# Patient Record
Sex: MALE | Race: WHITE | NOT HISPANIC OR LATINO | Employment: OTHER | URBAN - METROPOLITAN AREA
[De-identification: names, ages, dates, MRNs, and addresses within clinical notes are randomized per-mention and may not be internally consistent; named-entity substitution may affect disease eponyms.]

---

## 2017-02-16 ENCOUNTER — ALLSCRIPTS OFFICE VISIT (OUTPATIENT)
Dept: OTHER | Facility: OTHER | Age: 77
End: 2017-02-16

## 2017-03-13 ENCOUNTER — GENERIC CONVERSION - ENCOUNTER (OUTPATIENT)
Dept: OTHER | Facility: OTHER | Age: 77
End: 2017-03-13

## 2018-01-10 NOTE — H&P
Demographics     Postal Code: [id number]     Admission Date: 4/4/2016     Procedure:  Aorto_bi_iliac graft AAA     Target Side: N/A     Discharge Status & Location: Alive     Discharge Date:         ICU Stay (day): 0  Preoperative Data     Family History of AAA: No     Risk Factors & Systems Review:         Weight Height Units: Metric (cm - kg)           Height (cm): 193           Height (in): 76        Hypertension: Yes        Hyperlipidemia: Yes     Previous procedures:         Bypass: No        CEA: No        Aneurysm Repair: No        PTA/Stent: No        Major Amputation: No        Prev LL Vein Procedure: No  Procedure     Indication: Endoleak post EVAR     Incision Time: 12:00:01 AM     Urgency: Elective     Redo Status: Primary     Anesthesia:         Anesthesia: General     Estimated blood loss (ml): 0     Procedure Duration (min): 0     Medications, fluids & blood transfusion:         Blood Transfusion: No           Packed RBCs (unit): 0           FF Plasma (unit): 0           Cryoprecipitate (unit): 0           Platelets (unit): 0        Autotransfusion (ml): 0  Postoperative Data     Time to extubation (hour): 0     Time to oral intake (hour): 0     Time to ambulation (hour): 0  Complications?: No     Myocardial Infarction: No     Dysrhythmia: No     CHF: No     Change of Renal Function: None     Respiratory: None     Return to OR: No        Bleeding: No        Infection: No        Thrombosis: No        Revision: No     Bowel ischemia: None     Leg ischemia/embolism: None     Wound Infection: No

## 2018-01-12 NOTE — RESULT NOTES
Message   Patient will require open revision of endovascular aneurysm repair type I endoleak  Currently scheduled to see me March 8, 2016 at [facility]  If timeslot available and if the patient is willing to travel to the [facility] he can see Dr Judie Huerta next week to initiate the preoperative planning  Verified Results  CTA ABDOMEN PELVIS W WO CONTRAST [id number] 08:45AM Bita Forman     Test Name Result Flag Reference   CTA ABDOMEN PELVIS W WO CONTRAST (Report)     * I personally telephoned this result to [facility] on 2/16/2016 2:30 PM    CT ANGIOGRAM OF THE ABDOMEN AND PELVIS WITH AND WITHOUT IV CONTRAST     INDICATION: Abdominal aortic aneurysm status post endovascular aneurysm repair  History of endoleak  COMPARISON: January 20, 2015     TECHNIQUE: CT angiogram examination of the abdomen and pelvis was performed according to standard protocol  Contrast as well as noncontrast images were obtained  100 ml of Omnipaque 350 was injected intravenously  3D reconstructions were performed an independent workstation, and are supplied for review  FINDINGS:     VASCULAR STRUCTURES: Status post endovascular aneurysm repair with a Austell excluder endograft and left limb extension for type I endoleak  There continues to be a large type I endoleak from the proximal end of the graft  The aneurysm sac now measures    80 3 x 71 3 mm (previously 70 2 x 65 1 mm  In addition, the neck of the aneurysm is visibly enlarged measuring 42 9 mm just inferior to the left renal artery (previously 35 3 mm)  The celiac artery, superior mesenteric artery, and both renal arteries    are patent  The right and left iliofemoral segments and right internal iliac artery are patent  The left internal iliac artery remains occluded status post endovascular repair  OTHER FINDINGS     ABDOMEN     LOWER CHEST: No significant abnormality in the lung bases       LIVER/BILIARY TREE: Stable appearance of the multiple hepatic hemangiomas  GALLBLADDER: No calcified gallstones  No pericholecystic inflammatory change  SPLEEN: Unremarkable  Normal size  PANCREAS: Unremarkable  ADRENAL GLANDS: Unremarkable  KIDNEYS/URETERS: No solid renal mass  No hydronephrosis  No urinary tract calculi  PELVIS     REPRODUCTIVE ORGANS: Unremarkable for patient's age  URINARY BLADDER: Unremarkable  ADDITIONAL ABDOMINAL AND PELVIC STRUCTURES     STOMACH AND BOWEL: Unremarkable  ABDOMINOPELVIC CAVITY: No pathologically enlarged mesenteric or retroperitoneal lymph nodes  No ascites or free intraperitoneal air  ABDOMINAL WALL/INGUINAL REGIONS: Right inguinal hernia containing a loop of colon  Umbilical hernia with a loop of bowel  OSSEOUS STRUCTURES: No acute fracture or destructive osseous lesion  IMPRESSION:     Persistent type I endoleak from the proximal graft with enlarging aneurysm sac size and aneurysm neck  Right inguinal and umbilical hernia         Workstation performed: [id number]     Signed by:   Sparkle Briseno MD   2/16/16   100

## 2018-01-13 VITALS
BODY MASS INDEX: 31.81 KG/M2 | WEIGHT: 240 LBS | HEIGHT: 73 IN | SYSTOLIC BLOOD PRESSURE: 130 MMHG | DIASTOLIC BLOOD PRESSURE: 80 MMHG

## 2018-01-13 NOTE — RESULT NOTES
Message   Needs OV to discuss CTA and options     Verified Results  CTA ABDOMEN PELVIS W WO CONTRAST [id number] 08:45AM Nadia Coukaya     Test Name Result Flag Reference   CTA ABDOMEN PELVIS W [address] (Report)     * I personally telephoned this result to [facility] on 2/16/2016 2:30 PM      Workstation performed: [id number]       IMPRESSION:     Persistent type I endoleak from the proximal graft with enlarging aneurysm sac size and aneurysm neck  Right inguinal and umbilical hernia         Workstation performed: [id number]     Signed by:   Aaliyah Diaz MD   2/16/16   100

## 2018-01-14 NOTE — MISCELLANEOUS
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)   3  Atrial fibrillation (427 31) (I48 91)   4  Carotid artery stenosis (433 10) (I65 29)   5  Chronic venous hypertension (459 30) (I87 309)   6  Coronary atherosclerosis of native coronary artery (414 01) (I25 10)   7  Endoleak post endovascular aneurysm repair, subsequent encounter (V58 89,996 1)   (T82 330D)   8  Hyperlipidemia (272 4) (E78 5)   9  Hypertension (401 9) (I10)    Current Meds   1  Carbidopa-Levodopa  MG Oral Tablet; Therapy: (Recorded:61Iby8323) to Recorded   2  Diltiazem HCl - 30 MG Oral Tablet; Therapy: (Kimberly Bar) to Recorded   3  Jantoven 5 MG Oral Tablet; Therapy: ([id number]) to Recorded   4  Metoprolol Tartrate 50 MG Oral Tablet; TAKE 1 TABLET TWICE DAILY; Therapy: (Kimberly Bar) to Recorded   5  Simvastatin 20 MG Oral Tablet; Therapy: (Kimberly Bar) to Recorded   6  Taztia  MG Oral Capsule Extended Release 24 Hour; Therapy: [id number] to Recorded    Allergies    1   Penicillins    Signatures   Electronically signed by : Lobito Hamilton, ; Apr 11 2016 11:54AM EST                       (Author)

## 2018-01-14 NOTE — MISCELLANEOUS
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)  3  Atrial fibrillation (427 31) (I48 91)  4  Carotid artery stenosis (433 10) (I65 29)  5  Chronic venous hypertension (459 30) (I87 309)  6  Coronary atherosclerosis of native coronary artery (414 01) (I25 10)  7  Endoleak post endovascular aneurysm repair, subsequent encounter (V58 89,996 1)   (T82 330D)  8  Hyperlipidemia (272 4) (E78 5)  9  Hypertension (401 9) (I10)    Current Meds  1  Carbidopa-Levodopa  MG Oral Tablet; Therapy: (Recorded:68Phg4560) to Recorded  2  Diltiazem HCl - 30 MG Oral Tablet; Therapy: (Lorriane Mahamed) to Recorded  3  Jantoven 5 MG Oral Tablet; Therapy: ([id number] 8999) to Recorded  4  Metoprolol Tartrate 50 MG Oral Tablet; TAKE 1 TABLET TWICE DAILY; Therapy: (Lorriane Billow) to Recorded  5  Simvastatin 20 MG Oral Tablet; Therapy: (Lorriane Billow) to Recorded  6  Taztia  MG Oral Capsule Extended Release 24 Hour; Therapy: [id number] to Recorded    Allergies   1  Penicillins    Signatures   Electronically signed by :  Des Lewis, ; Feb 26 2016  9:37AM EST                       (Author)

## 2018-01-15 NOTE — PROGRESS NOTES
Preliminary Nursing Report                Patient Information    Initial Encounter Entry Date:   3/21/2016 3:30 PM EST (Automated Transmission Automated Transmission)       Last Modified:   {Barry Alfaro}              Legal Name: [employer] [place] Number:        YOB: 1940        Age (years): 66        Gender: M        Body Mass Index (BMI): 28 kg/m2        Height: 76 in  Weight: 228 lbs (103 kgs)           Address:   [address] [place] [id number]               Phone: [phone]   (consent to leave messages)        Email:        Ethnicity: Decline to State        Islam:        Marital Status:        Preferred Language: English        Race: Other Race                    Patient Insurance Information        Primary Insurance Information Carrier Name: {Primary  CarrierName}           Carrier Address:   {Primary  CarrierAddress}              Carrier Phone: {Primary  CarrierPhone}          Group Number: {Primary  GroupNumber}          Policy Number: {Primary  PolicyNumber}          Insured Name: {Primary  InsuredName}          Insured : {Primary  InsuredDOB}          Relationship to Insured: {Primary  RelationshiptoInsured}           Secondary Insurance Information Carrier Name: {Secondary  CarrierName}           Carrier Address:   {Secondary  CarrierAddress}              Carrier Phone: {Secondary  CarrierPhone}          Group Number: {Secondary  GroupNumber}          Policy Number: {Secondary  PolicyNumber}          Insured Name: {Secondary  InsuredName}          Insured : {Secondary  InsuredDOB}          Relationship to Insured: {Secondary  RelationshiptoInsured}                       Health Profile   Booking #:   Iker Denny #: [id number]               DOS: 2016    Surgery : Open repair of infrarenal aortic aneurysm or dissection, plus repair of associated arterial trauma, following unsuccessful endovascular repair; [id number] prosthesis Add'l Procedures/Notes:     Surgery Risk: Major          Precautions     Atrial fibrillation       Coronary atherosclerosis of native coronary artery                   Allergies    Penicillins             Medications    Carbidopa-Levodopa  MG Oral Tablet       Diltiazem HCl - 30 MG Oral Tablet       Jantoven 5 MG Oral Tablet       Metoprolol Tartrate 50 MG Oral Tablet       Simvastatin 20 MG Oral Tablet       Taztia  MG Oral Capsule Extended Release 24 Hour               Conditions    Aneurysm of abdominal aorta       Atheroscler native arteries the extremities w/intermit claudication       Atrial fibrillation       Carotid artery stenosis       Chronic venous hypertension       Coronary atherosclerosis of native coronary artery       Endoleak post endovascular aneurysm repair, subsequent encounter       Hyperlipidemia       Hypertension               Family History    None             Surgical History    None             Social History    Former smoker                               Patient Instructions       ? NPO Instructions   The day before surgery it is recommended to have a light dinner at your usual time and you are allowed a light snack early in the evening  Do not eat anything heavy or eat a big meal after 7pm  Do not eat or drink anything after midnight prior to your surgery  If you are supposed to take any of your medications, do so with a sip of water  Failure to follow these instructions can lead to an increased risk of lung complications and may result in a delay or cancellation of your procedure  If you have any questions, contact your institution for further instructions  No candy, no gum, no mints, no chewing tobacco   Triggered by: Medical Procedure Risk         ? Beta Blocker 3, 2, c, 4, 6, 5  Please continue to take this medication on your normal schedule  If this is an oral medication and you take in the morning, you may do so with a sip of water    Triggered by: Metoprolol Tartrate 50 MG Oral Tablet         ? Calcium Blocker (Blood Pressure Medication) 3, 4, 6, 5, 2  Please continue to take this medication on your normal schedule  If this is an oral medication and you take in the morning, you may do so with a sip of water  Triggered by: Diltiazem HCl - 30 MG Oral Tablet, , Taztia  MG Oral Capsule Extended Release 24 Hour         ? Cholesterol Medication 81, 82  Please continue to take this medication on your normal schedule  If this is an oral medication and you take in the morning, you may do so with a sip of water  Triggered by: Simvastatin 20 MG Oral Tablet         ? Diabetic Medication   Please decrease your morning insulin dose to one-half of normal dose  If you are taking oral diabetes medications, the morning dose should be omitted  If taking metformin (Glucophage), discontinue the medication for 24 hours prior to surgery  If you have an insulin pump, continue at a basal rate only  Triggered by: Shon Oris 5 MG Oral Tablet         ? Levodopa/Carbidopa (Parkinson Medications) 49, 50, 51  Please continue to take this medication on your normal schedule  If this is an oral medication and you take in the morning, you may do so with a sip of water  Triggered by: Carbidopa-Levodopa  MG Oral Tablet               Testing Considerations       ? Coagulation Tests (PT/PTT/INR) t  Triggered by: Aneurysm of abdominal aorta, Carotid artery stenosis         ? Complete Blood Count (CBC) t, client, client  If test was completed and normal within last six months, repeat test is not necessary  Triggered by: Aneurysm of abdominal aorta, Atrial fibrillation, Carotid artery stenosis, Coronary atherosclerosis of native coronary artery, Age or Facility Rec         ? Comprehensive Metabolic Panel (CMP) t  If test was completed and normal within last six months, repeat test is not necessary  Triggered by:  Aneurysm of abdominal aorta, Atrial fibrillation, Coronary atherosclerosis of native coronary artery         ? Consider Chest X-ray (CXR) t  Consider a Chest X-Ray (CXR) if patient is having respiratory symptoms  Triggered by: Aneurysm of abdominal aorta, Carotid artery stenosis, Age or Facility Rec         ? Electrocardiogram (ECG) t  Patient does not need new test if normal ECG is present within the last six months and no change in clinical condition  Triggered by: Aneurysm of abdominal aorta, Atrial fibrillation, Carotid artery stenosis, Hypertension, Chronic venous hypertension, Coronary atherosclerosis of native coronary artery, Age or Facility Rec         ? Type and Screen client  Type and Screen - Blood: If there is anticipated or possible large blood loss with this procedure, then a Type and Screen for Blood should be ordered  Triggered by: Age or Facility Rec               Consultations       ? Cardiac Consult (Major MI) c  If the patient has had a Myocardial Infarction within 30 days then a cardiac consult is indicated  Also, if the patient is having increasing frequency or intensity of chest pain then a cardiac consult is indicated  Otherwise, optimization of medical therapy is recommended under the direction of the PCP or cardiologist   Triggered by: Coronary atherosclerosis of native coronary artery         ? Cardiac Consult (Major Rate) c  If the patient has a heart rate of greater than 100 bpm, or if the heart rhythm disturbance is new, then a cardiac consult is indicated  Otherwise, optimization of medical therapy is recommended under the direction of the PCP or cardiologist   Triggered by: Atrial fibrillation         ? Cardiac Evaluation   Further evaluation of cardiopulmonary status should be strongly considered  Triggered by: Aneurysm of abdominal aorta, Carotid artery stenosis         ? Primary Care Physician Evaluation   Primary care physician may need to evaluate patient prior to surgery  This is likely NOT necessary if the listed conditions are chronic and stable    Triggered by: Medical Procedure Risk               Miscellaneous Questions         Question: Are you able to walk up a flight of stairs, walk up a hill or do heavy housework WITHOUT having chest pain or shortness of breath? Answer: YES                   Allergies/Conditions/Medications Not Found        The following were not recognized by our system when generating the recommendations  Please consider if this would impact any preoperative protocols  ? Endoleak post endovascular aneurysm repair, subsequent encounter                  Appointment Information         Date:    04/04/2016        Location:    [place]        Address:           Directions:                      Footnotes revision 14      ?? Denotes a free-text entry  Legal Disclaimer: Any and all recommendations and services provided herein are designed to assist in the preoperative care of the patient  Nothing contained herein is designed to replace, eliminate or alleviate the responsibility of the attending physician to supervise and determine the patient?s preoperative care and course of treatment  Failure to provide complete, accurate information may negatively impact the system?s ability to recommend the proper preoperative protocol  THE ATTENDING PHYSICIAN IS RESPONSIBLE TO REVIEW THE SUGGESTED PREOPERATIVE PROTOCOLS/COURSE OF TREATMENT AND PRESCRIBE THE FINAL COURSE OF PREOPERATIVE TREATMENT IN CONSULTATION WITH THE PATIENT  THE ePREOP SYSTEM AND ITS MATERIALS ARE PROVIDED ? AS IS? WITHOUT WARRANTY OF ANY KIND, EXPRESS OR IMPLIED, INCLUDING, BUT NOT LIMITED TO, WARRANTIES OF PERFORMANCE OR MERCHANTABILITY OR FITNESS FOR A PARTICULAR PURPOSE  PATIENT AND PHYSICIANS HEREBY AGREE THAT THEIR USE OF THE MATERIALS AND RESOURCES ACT AS A CONSENT TO RELEASE AND WAIVE ePREOP FROM ANY AND ALL CLAIMS OF WARRANTY, TORT OR CONTRACT LAW OF ANY KIND             Electronically signed by:Cyril Mckinnon MD  Mar 23 2016 10:55AM EST

## 2018-01-15 NOTE — MISCELLANEOUS
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)   3  Atrial fibrillation (427 31) (I48 91)   4  Carotid artery stenosis (433 10) (I65 29)   5  Chronic venous hypertension (459 30) (I87 309)   6  Coronary atherosclerosis of native coronary artery (414 01) (I25 10)   7  Endoleak post endovascular aneurysm repair, subsequent encounter (V58 89,996 1)   (T82 330D)   8  Hyperlipidemia (272 4) (E78 5)   9  Hypertension (401 9) (I10)    Current Meds   1  Carbidopa-Levodopa  MG Oral Tablet; Therapy: (Recorded:[id number]) to Recorded   2  Diltiazem HCl - 30 MG Oral Tablet; Therapy: (Jenny Jaeger) to Recorded   3  Jantoven 5 MG Oral Tablet; Therapy: ([id number] [id number]) to Recorded   4  Metoprolol Tartrate 50 MG Oral Tablet; TAKE 1 TABLET TWICE DAILY; Therapy: (Jenny Jaeger) to Recorded   5  Simvastatin 20 MG Oral Tablet; Therapy: (Jenny Jaeger) to Recorded   6  Taztia  MG Oral Capsule Extended Release 24 Hour; Therapy: [id number] to Recorded    Allergies    1  Penicillins    Signatures   Electronically signed by :  Martha Perkins, ; Apr 13 2016  4:08PM EST                       (Author)

## 2018-01-16 NOTE — MISCELLANEOUS
Message  both daughters Lanelle Canavan and Mónica Hernandez notified of below    From: Karan Washington   Sent: Tuesday, April 12, 2016 10:38 AM  To: Dayna Lynn  Subject: RE: Gin Aaron 1940    He should come into the office to be seen  No great treatment for hiccupps other than treating the reason causing them and not sure what that is in this case  Kecia Haro MD FACS   Vascular   [address] [place] [address]        ________________________________________  From: Dayna Lynn  Sent: Tuesday, April 12, 2016 10:03 AM  To: Karan Washington  Subject: Gin Aaron 1940  He was your OAR 4/4/16  He c/o constant hiccups x 2 days  Not sure what to tell him regarding this  Anything he can do to help with this? Thanks! 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)   3  Atrial fibrillation (427 31) (I48 91)   4  Carotid artery stenosis (433 10) (I65 29)   5  Chronic venous hypertension (459 30) (I87 309)   6  Coronary atherosclerosis of native coronary artery (414 01) (I25 10)   7  Endoleak post endovascular aneurysm repair, subsequent encounter (V58 89,996 1)   (T82 330D)   8  Hyperlipidemia (272 4) (E78 5)   9  Hypertension (401 9) (I10)    Current Meds   1  Carbidopa-Levodopa  MG Oral Tablet; Therapy: (Recorded:85Kbo7777) to Recorded   2  Diltiazem HCl - 30 MG Oral Tablet; Therapy: (Noel Lizeth) to Recorded   3  Jantoven 5 MG Oral Tablet; Therapy: (Beba Grider) to Recorded   4  Metoprolol Tartrate 50 MG Oral Tablet; TAKE 1 TABLET TWICE DAILY; Therapy: (Noel Lizeth) to Recorded   5  Simvastatin 20 MG Oral Tablet; Therapy: (Noel Lizeth) to Recorded   6  Taztia  MG Oral Capsule Extended Release 24 Hour; Therapy: [id number] to Recorded    Allergies    1   Penicillins    Signatures   Electronically signed by : Breanna Glover, ; Apr 12 2016  1:25PM EST                       (Author)

## 2018-01-16 NOTE — MISCELLANEOUS
Message  shagufta from a notified    From: Dominguez Simmons: Tuesday, April 19, 2016 3:14 PM  To: Osvaldo Trinidadan: Caden Shannon  Subject: RE: Ailyn Disla 1940    Would try increasing fiber intake and Colace stool softener (instead of MOM)  For swelling would advise elevation  Once seen in office tomorrow tubi  may be suggested but don't necessarily want ACE wraps until I know more about peripheral circulation  Maxx Hodgson, [facility]  [facility]  Certified Registered Nurse Practitioner  [address] [facility], [address] [place] [address], [address] [place] [address]  [phone]    Confidentiality Notice: This e-mail message, including any attachments, is for the sole use of intended recipient(s) and may contain confidential and privileged information  Any unauthorized review, use, disclosure or distribution is prohibited  If you are not the intended recipient, please contact the sender by reply e-mail and destroy all copies of the original message  From: Nahomi Bowman   Sent: Tuesday, April 19, 2016 2:17 PM  To: Maryuri Villa  Cc: Caden Shannon  Subject: Ailyn Disla 1940    He was Dr Ulysses Padilla 4/4/2016   [facility] called to report slow moving bowels with abd distention- Can he have Milk of Mag? Also, he had some post op leg edema, [facility] put him on Lasix and now his legs are starting to weep  Can they use ace wraps? 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)   3  Atrial fibrillation (427 31) (I48 91)   4  Carotid artery stenosis (433 10) (I65 29)   5  Chronic venous hypertension (459 30) (I87 309)   6  Coronary atherosclerosis of native coronary artery (414 01) (I25 10)   7  Endoleak post endovascular aneurysm repair, subsequent encounter (V58 89,996 1)   (T82 330D)   8  Hiccoughs (786 8) (R06 6)   9  Hyperlipidemia (272 4) (E78 5)   10  Hypertension (401 9) (I10)   11  Postoperative state (V45 89) (Z98 89)   12  Swelling (782 3) (R60 9)    Current Meds   1  Carbidopa-Levodopa  MG Oral Tablet; Therapy: (Recorded:[id number]) to Recorded   2  Diltiazem HCl - 30 MG Oral Tablet; Therapy: (Camilo Sanches) to Recorded   3  Furosemide 40 MG Oral Tablet; TAKE 1 TABLET DAILY AS DIRECTED; Therapy: 14Apr2016 to (Evaluate:21Apr2016); Last Rx:14Apr2016 Ordered   4  Jantoven 5 MG Oral Tablet; Therapy: [phone]) to Recorded   5  Metoprolol Tartrate 50 MG Oral Tablet; TAKE 1 TABLET TWICE DAILY; Therapy: (Camilo Sanches) to Recorded   6  Oxycodone-Acetaminophen 5-325 MG Oral Tablet; TAKE 1 TABLET EVERY 8 HOURS   AS NEEDED; Therapy: 14Apr2016 to (Evaluate:26Mrq5476); Last Rx:14Apr2016 Ordered   7  Simvastatin 20 MG Oral Tablet; Therapy: (Camilo Sanches) to Recorded   8  Taztia  MG Oral Capsule Extended Release 24 Hour; Therapy: [id number] to Recorded    Allergies    1   Penicillins    Signatures   Electronically signed by : Odilia Garrison, ; Apr 19 2016  3:20PM EST                       (Author)

## 2018-01-18 NOTE — MISCELLANEOUS
Dear Sinan Hamilton : We missed you for your originally scheduled neurological followup appointment with Dr Henry Keating  Please call at your earliest convenience to reschedule this appointment  Sincerely,     Abdon Carrizales 102      Electronically signed by: Stephanie Bustamante   Apr 18 2016  8:30AM EST Co-author

## 2018-01-18 NOTE — PROCEDURES
Procedures by Tesha Contreras MD  at 4/5/2016 11:09 AM      Author:  Tesha Contreras MD Service:  Critical Care/ICU Author Type:  Resident    Filed:  4/5/2016 11:20 AM Date of Service:  4/5/2016 11:09 AM Status:  Attested    :  Tesha Contreras MD (Resident)  Cosigner:  Clara Barrios DO at 4/7/2016  3:24 PM      Pre-procedure Diagnoses:       1  Postoperative hypovolemic shock, initial encounter [T81 19XA]                Post-procedure Diagnoses:       1  Postoperative hypovolemic shock, initial encounter [T81 19XA]                Procedures:       1  CENTRAL LINE [ADF38 (Custom)]              Attestation signed by Clara Barrios DO at 4/7/2016  3:24 PM           I was present and supervised all critical elements of this procedure  I ensured full compliance with sterile procedure was followed  Procedure Note PGY 2  Procedure: R IJ Central venous catheter insertion    Discussed with patient and daughter Kelsea Dixon via the telephone the need for central line placement due to increasing pressor requirements and acquisition of more diagnostic data to ascertain etiology of patient's hypotension  Dr Kelsea Dixon and patient gave verbal  consent for procedure  The patient was placed supine on the bed and given 1 mg of Ativan  After the patient was correctly identified and the planned procedure agreed upon by all team members present the patient was prepped and draped in the standard surgical fashion  The patient was then placed in Trendelenburg position  And was turned the  left  Using ultrasound the right internal jugular vein was identified as well as the right carotid artery AND lidocaine approximately 3 cc was used as local anesthesia  The vein was easily compressible and quite large and a -pulsatile noncompressible  was noted to be medial and posterior to the vein  The vein wasn't accessed  Cutaneously under direct visualization of ultrasound   Venous blood was withdrawn  The syringe was removed and no pulsatile blood was noted  A guidewire was advanced into the introducer  needle  A small incision was made at the skin surface with an 11 blade scalpel  The needle was withdrawn and dilator was placed over the guidewire to dilate the subcutaneous tissues  The dilator was removed and a 7 Western Ami triple lumen catheter was placed  over the guidewire wire using the classic Salinger technique  The guidewires and removed  The catheter was inserted into place and all ports were aspirated and flushed with normal saline  The area was cleansed and dried again and a Tegaderm was used as  a sterile dressing over the skin exit site of the triple lumen catheter  The patient tolerated the procedure well with no apparent complications  Post procedure x-ray was obtained and showed the catheter to be in good position in the cavoatrial junction  with no pneumothorax noted on wet read  Dr Priya Waterman  was present for the entire procedure      Billie Hogue MD             Received for:Andrew Rosenberg MD  Apr 7 2016  3:25PM [facility] Standard Time

## 2018-03-07 NOTE — PROCEDURES
Procedure    Surgeon: Dr Camarena [address]   Procedure:   Cognitive Assessment   Cognitive Assessment: Mini- Cog Total Score: 4    denies   Depression Screening   Depression Screening: Total Score:       denies   Cardiac Risk Factors Include:   1) AFIB       Pulmonary Risk Factors Include:      Functional/Performance Assessment   Able to stand up from a chair by themselves and on the first try  Able to get dressed by self  Able to bathe by self  Able to make own meals  Able to get to bathroom by self  There is a bathroom in the home on the same floor they will sepnd most of their time in after surgery  Patient is able to obtain assistance after surgery in their home from a relative or other caregiver that does not reside with them  and has experienced 0 fall(s) in the last year  Frailty Assessment   Frailty Score: 2-3  Weight Loss = 0  Decreased  Strength (weakness)  Exhaustion, 1 = Some or a litle of the time (1-2 days)  Low Physical Activity = 1  Slowed Walking Speed  Interpretation of the Frailty Score, 2 - 3 = Intermediate frail (Pre-frail) inpatient physical therapy consult is appropriate  PRE-FRAIL   Gait & Mobility Assessment Patient required more than 15 seconds to complete TGUT  inpatient physical therapy consult is appropriate  standard   Nutritional Assessment r /rs  had not experienced unexplained weightloss in the past year  Caregiver burden score: (0) No burden at all   Summary: PATIENT LIVES AT [address] 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)   3  Atrial fibrillation (427 31) (I48 91)   4  Carotid artery stenosis (433 10) (I65 29)   5  Chronic venous hypertension (459 30) (I87 309)   6  Coronary atherosclerosis of native coronary artery (414 01) (I25 10)   7  Endoleak post endovascular aneurysm repair, subsequent encounter (V58 89,996 1)   (T82 330D)   8  Hyperlipidemia (272 4) (E78 5)   9  Hypertension (401 9) (I10)    Current Meds    1  Carbidopa-Levodopa  MG Oral Tablet; Therapy: (Recorded:35Iqn0587) to Recorded   2  Diltiazem HCl - 30 MG Oral Tablet; Therapy: (Libby Bjornstad) to Recorded   3  Jantoven 5 MG Oral Tablet; Therapy: ([id number]) to Recorded   4  Metoprolol Tartrate 50 MG Oral Tablet; TAKE 1 TABLET TWICE DAILY; Therapy: (Libby Bjornstad) to Recorded   5  Simvastatin 20 MG Oral Tablet; Therapy: (Libby Bjornstad) to Recorded   6  Taztia  MG Oral Capsule Extended Release 24 Hour; Therapy: [id number] to Recorded    Allergies    1   Penicillins    Signatures   Electronically signed by : Hira Martinez; Mar 29 2016  1:02PM EST                       (Author)    Electronically signed by : Bard Wandy MD; Mar 29 2016  1:18PM EST

## 2018-04-18 ENCOUNTER — TRANSCRIBE ORDERS (OUTPATIENT)
Dept: VASCULAR SURGERY | Facility: CLINIC | Age: 78
End: 2018-04-18

## 2018-04-18 DIAGNOSIS — I71.4 ABDOMINAL AORTIC ANEURYSM WITHOUT RUPTURE (HCC): Primary | ICD-10-CM

## 2018-04-19 ENCOUNTER — HOSPITAL ENCOUNTER (OUTPATIENT)
Dept: NON INVASIVE DIAGNOSTICS | Facility: CLINIC | Age: 78
Discharge: HOME | End: 2018-04-19
Payer: MEDICARE

## 2018-04-19 DIAGNOSIS — I71.4 ABDOMINAL AORTIC ANEURYSM WITHOUT RUPTURE (HCC): ICD-10-CM

## 2018-04-19 PROCEDURE — 93978 VASCULAR STUDY: CPT

## 2018-04-19 PROCEDURE — 93925 LOWER EXTREMITY STUDY: CPT

## 2018-04-19 PROCEDURE — 93923 UPR/LXTR ART STDY 3+ LVLS: CPT

## 2018-04-23 ENCOUNTER — OFFICE VISIT (OUTPATIENT)
Dept: VASCULAR SURGERY | Facility: CLINIC | Age: 78
End: 2018-04-23
Payer: MEDICARE

## 2018-04-23 VITALS
RESPIRATION RATE: 20 BRPM | BODY MASS INDEX: 30.48 KG/M2 | HEIGHT: 73 IN | WEIGHT: 230 LBS | DIASTOLIC BLOOD PRESSURE: 82 MMHG | SYSTOLIC BLOOD PRESSURE: 122 MMHG | HEART RATE: 69 BPM

## 2018-04-23 DIAGNOSIS — Z98.890 HISTORY OF REPAIR OF ANEURYSM OF ABDOMINAL AORTA: ICD-10-CM

## 2018-04-23 DIAGNOSIS — Z95.828 H/O ENDOVASCULAR STENT GRAFT FOR ABDOMINAL AORTIC ANEURYSM: Primary | ICD-10-CM

## 2018-04-23 DIAGNOSIS — I73.9 CLAUDICATION OF CALF MUSCLES (HCC): ICD-10-CM

## 2018-04-23 DIAGNOSIS — I87.2 VENOUS INSUFFICIENCY (CHRONIC) (PERIPHERAL): ICD-10-CM

## 2018-04-23 PROCEDURE — 93922 UPR/L XTREMITY ART 2 LEVELS: CPT | Performed by: SURGERY

## 2018-04-23 PROCEDURE — 93978 VASCULAR STUDY: CPT | Performed by: SURGERY

## 2018-04-23 PROCEDURE — 93925 LOWER EXTREMITY STUDY: CPT | Performed by: SURGERY

## 2018-04-23 PROCEDURE — 99214 OFFICE O/P EST MOD 30 MIN: CPT | Performed by: SURGERY

## 2018-04-23 RX ORDER — ERGOCALCIFEROL (VITAMIN D2) 10 MCG
1 TABLET ORAL DAILY
COMMUNITY

## 2018-04-23 RX ORDER — CARBIDOPA/LEVODOPA 25MG-250MG
1 TABLET ORAL 3 TIMES DAILY
COMMUNITY
Start: 2016-06-10 | End: 2020-01-16 | Stop reason: SDUPTHER

## 2018-04-23 RX ORDER — WARFARIN SODIUM 5 MG/1
5 TABLET ORAL
COMMUNITY

## 2018-04-23 NOTE — PROGRESS NOTES
Assessment/Plan:    Claudication of calf muscles (HCC)  Bilateral calf pain with ambulation  It appears to be nonspecific  I reviewed the lower extremity arterial Doppler there is no focal area of stenosis is diffuse atherosclerotic disease  His toe pressures are normal and ABIs noncompressible  The waveforms appear to be normal  No intervention recommended at this point the he is on warfarin for atrial fibrillation  History of repair of aneurysm of abdominal aorta  Complex past history of various aortic procedures for comedown aortic aneurysm  He had a endograft placed in 2011 followed by a proximal extension processes for endoleak in 2014  He then ultimately had an explantation of the endograft in 2016 with an open repair with aortobiiliac graft for endoleak  Current aortic duplex performed last week shows no evidence of filling in the aortic sac and a patent aortobiiliac bypass  We will monitor again with 1 year follow-up  Venous insufficiency (chronic) (peripheral)  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  There is swelling of the left lower extremity more than the right  There is scaling of the skin as well  I have asked him and the family to apply moisturizer twice daily, elevate leg  Diagnoses and all orders for this visit:    H/O endovascular stent graft for abdominal aortic aneurysm    History of repair of aneurysm of abdominal aorta  -     VAS abdominal aorta/iliacs; complete study; Future  -     VAS lower limb arterial duplex, complete bilateral; Future    Claudication of calf muscles (HCC)  -     VAS abdominal aorta/iliacs; complete study; Future  -     VAS lower limb arterial duplex, complete bilateral; Future    Other orders  -     warfarin (JANTOVEN) 5 mg tablet; Take by mouth  -     carbidopa-levodopa (SINEMET)  mg per tablet;  Take 1 tablet by mouth 3 (three) times a day  -     Multiple Vitamin (DAILY VALUE MULTIVITAMIN) TABS; Take 1 tablet by mouth daily          Subjective:      Patient ID: Carie Augustin is a 76 y o  male  Owen Franco is here to review results of his duplex that was performed last week  He also complains of bilateral pain in his upper calf lower thigh muscles after walking for a few minutes  He then takes to rest and that resolves  This has been ongoing for several months  It is not limiting his day-to-day activity  He is performing physical therapy exercises at home  He suffers from Parkinson's which has resulted in some gait disturbances and he uses cane  He uses Tylenol as needed for the pain  He was taking ibuprofen before but his family doctor has asked him to switch over to Tylenol  The following portions of the patient's history were reviewed and updated as appropriate: allergies, current medications, past family history, past medical history, past social history, past surgical history and problem list     Review of Systems   Constitutional: Positive for fatigue  HENT: Positive for hearing loss  Eyes: Negative  Respiratory: Negative  Cardiovascular: Negative  Gastrointestinal: Negative  Endocrine: Negative  Genitourinary: Negative  Musculoskeletal: Negative  Skin: Positive for color change  Allergic/Immunologic: Negative  Neurological: Negative  Hematological: Negative  Psychiatric/Behavioral: Negative  Objective:      /82 (BP Location: Left arm, Patient Position: Sitting, Cuff Size: Standard)   Pulse 69   Resp 20   Ht 6' 1" (1 854 m)   Wt 104 kg (230 lb)   BMI 30 34 kg/m²          Physical Exam   Constitutional: He is oriented to person, place, and time  He appears well-developed and well-nourished  HENT:   Head: Normocephalic and atraumatic  Cardiovascular: Normal rate, S1 normal and S2 normal   An irregular rhythm present  Murmur heard  Triphasic bilateral anterior tibial signals present   It is difficult to palpate a pulse secondary to thickened skin from chronic venous insufficiency  Bilateral lower extremity lipoma dermatosclerosis changes secondary to chronic venous insufficiency   Pulmonary/Chest: Effort normal and breath sounds normal  No respiratory distress  He has no wheezes  He has no rales  Abdominal: Soft  He exhibits no distension  There is no tenderness  Musculoskeletal: Normal range of motion  He exhibits edema (Left more than right)  Neurological: He is alert and oriented to person, place, and time  Tremor in both hands   Skin: Skin is warm and dry  Psychiatric: He has a normal mood and affect  His behavior is normal    Nursing note and vitals reviewed

## 2018-04-23 NOTE — ASSESSMENT & PLAN NOTE
Bilateral calf pain with ambulation  It appears to be nonspecific  I reviewed the lower extremity arterial Doppler there is no focal area of stenosis is diffuse atherosclerotic disease  His toe pressures are normal and ABIs noncompressible  The waveforms appear to be normal  No intervention recommended at this point the he is on warfarin for atrial fibrillation

## 2018-04-23 NOTE — ASSESSMENT & PLAN NOTE
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

## 2018-04-23 NOTE — LETTER
April 23, 2018     Loraine Brewster MD  [address]  [address] [facility],[address] [facility]    Patient: Domonique Tim   YOB: 1940   Date of Visit: 4/23/2018       Dear Dr Pau Pope:    Thank you for referring Domonique Tim to me for evaluation  Below are my notes for this consultation  If you have questions, please do not hesitate to call me  I look forward to following your patient along with you  Sincerely,        Ron Ramirez MD        CC: MD Ron Mirza MD  4/23/2018  9:54 AM  Sign at close encounter  Assessment/Plan:    Claudication of calf muscles (HCC)  Bilateral calf pain with ambulation  It appears to be nonspecific  I reviewed the lower extremity arterial Doppler there is no focal area of stenosis is diffuse atherosclerotic disease  His toe pressures are normal and ABIs noncompressible  The waveforms appear to be normal  No intervention recommended at this point the he is on warfarin for atrial fibrillation  History of repair of aneurysm of abdominal aorta  Complex past history of various aortic procedures for comedown aortic aneurysm  He had a endograft placed in 2011 followed by a proximal extension processes for endoleak in 2014  He then ultimately had an explantation of the endograft in 2016 with an open repair with aortobiiliac graft for endoleak  Current aortic duplex performed last week shows no evidence of filling in the aortic sac and a patent aortobiiliac bypass  We will monitor again with 1 year follow-up  Venous insufficiency (chronic) (peripheral)  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  There is swelling of the left lower extremity more than the right  There is scaling of the skin as well  I have asked him and the family to apply moisturizer twice daily, elevate leg         Diagnoses and all orders for this visit:    H/O endovascular stent graft for abdominal aortic aneurysm    History of repair of aneurysm of abdominal aorta  -     VAS abdominal aorta/iliacs; complete study; Future  -     VAS lower limb arterial duplex, complete bilateral; Future    Claudication of calf muscles (HCC)  -     VAS abdominal aorta/iliacs; complete study; Future  -     VAS lower limb arterial duplex, complete bilateral; Future    Other orders  -     warfarin (JANTOVEN) 5 mg tablet; Take by mouth  -     carbidopa-levodopa (SINEMET)  mg per tablet; Take 1 tablet by mouth 3 (three) times a day  -     Multiple Vitamin (DAILY VALUE MULTIVITAMIN) TABS; Take 1 tablet by mouth daily          Subjective:      Patient ID: Gary Ramsey is a 76 y o  male  Shellie Booker is here to review results of his duplex that was performed last week  He also complains of bilateral pain in his upper calf lower thigh muscles after walking for a few minutes  He then takes to rest and that resolves  This has been ongoing for several months  It is not limiting his day-to-day activity  He is performing physical therapy exercises at home  He suffers from Parkinson's which has resulted in some gait disturbances and he uses cane  He uses Tylenol as needed for the pain  He was taking ibuprofen before but his family doctor has asked him to switch over to Tylenol  The following portions of the patient's history were reviewed and updated as appropriate: allergies, current medications, past family history, past medical history, past social history, past surgical history and problem list     Review of Systems   Constitutional: Positive for fatigue  HENT: Positive for hearing loss  Eyes: Negative  Respiratory: Negative  Cardiovascular: Negative  Gastrointestinal: Negative  Endocrine: Negative  Genitourinary: Negative  Musculoskeletal: Negative  Skin: Positive for color change  Allergic/Immunologic: Negative  Neurological: Negative  Hematological: Negative      Psychiatric/Behavioral: Negative  Objective:      /82 (BP Location: Left arm, Patient Position: Sitting, Cuff Size: Standard)   Pulse 69   Resp 20   Ht 6' 1" (1 854 m)   Wt 104 kg (230 lb)   BMI 30 34 kg/m²           Physical Exam   Constitutional: He is oriented to person, place, and time  He appears well-developed and well-nourished  HENT:   Head: Normocephalic and atraumatic  Cardiovascular: Normal rate, S1 normal and S2 normal   An irregular rhythm present  Murmur heard  Triphasic bilateral anterior tibial signals present  It is difficult to palpate a pulse secondary to thickened skin from chronic venous insufficiency  Bilateral lower extremity lipoma dermatosclerosis changes secondary to chronic venous insufficiency   Pulmonary/Chest: Effort normal and breath sounds normal  No respiratory distress  He has no wheezes  He has no rales  Abdominal: Soft  He exhibits no distension  There is no tenderness  Musculoskeletal: Normal range of motion  He exhibits edema (Left more than right)  Neurological: He is alert and oriented to person, place, and time  Tremor in both hands   Skin: Skin is warm and dry  Psychiatric: He has a normal mood and affect  His behavior is normal    Nursing note and vitals reviewed

## 2018-04-23 NOTE — ASSESSMENT & PLAN NOTE
Complex past history of various aortic procedures for comedown aortic aneurysm  He had a endograft placed in 2011 followed by a proximal extension processes for endoleak in 2014  He then ultimately had an explantation of the endograft in 2016 with an open repair with aortobiiliac graft for endoleak  Current aortic duplex performed last week shows no evidence of filling in the aortic sac and a patent aortobiiliac bypass  We will monitor again with 1 year follow-up

## 2019-09-17 ENCOUNTER — TELEPHONE (OUTPATIENT)
Dept: NEUROLOGY | Facility: CLINIC | Age: 79
End: 2019-09-17

## 2019-09-30 ENCOUNTER — APPOINTMENT (OUTPATIENT)
Dept: PHYSICAL THERAPY | Facility: CLINIC | Age: 79
End: 2019-09-30

## 2019-10-01 ENCOUNTER — EVALUATION (OUTPATIENT)
Dept: PHYSICAL THERAPY | Facility: CLINIC | Age: 79
End: 2019-10-01
Payer: MEDICARE

## 2019-10-01 ENCOUNTER — TRANSCRIBE ORDERS (OUTPATIENT)
Dept: PHYSICAL THERAPY | Facility: CLINIC | Age: 79
End: 2019-10-01

## 2019-10-01 DIAGNOSIS — R26.81 UNSTEADY GAIT: Primary | ICD-10-CM

## 2019-10-01 PROCEDURE — 97112 NEUROMUSCULAR REEDUCATION: CPT | Performed by: PHYSICAL THERAPIST

## 2019-10-01 PROCEDURE — 97162 PT EVAL MOD COMPLEX 30 MIN: CPT | Performed by: PHYSICAL THERAPIST

## 2019-10-01 NOTE — LETTER
2019    Reyna Silva MD  [address] [id number]    Patient: Candy Kilgore   YOB: 1940   Date of Visit: 10/1/2019     Encounter Diagnosis     ICD-10-CM    1  Unsteady gait R26 81        Dear Dr Brittanie Kapadia: Thank you for your recent referral of Candy Kilgore  Please review the attached evaluation summary from Denis's recent visit  Please verify that you agree with the plan of care by signing the attached order  If you have any questions or concerns, please do not hesitate to call  I sincerely appreciate the opportunity to share in the care of one of your patients and hope to have another opportunity to work with you in the near future  Sincerely,    Alli Lundy, PT      Referring Provider:      I certify that I have read the below Plan of Care and certify the need for these services furnished under this plan of treatment while under my care  Reyna Silva MD  [facility] [address]: [phone]          PT Evaluation     Today's date: 10/1/2019  Patient name: Candy Kilgore  : 1940  MRN: [id number]  Referring provider: Cristino Kline MD  Dx:   Encounter Diagnosis     ICD-10-CM    1  Unsteady gait R26 81                   Assessment  Assessment details: Candy Kilgore is a 78 y o  male referred with primary diagnosis of Unsteady gait  (primary encounter diagnosis)   Patient presents with the following functional limitations: Difficulty sit to stand transfers, festinating gait, and negotiates stairs step-to  Patient has a history of Parkinson's disease  Patient scored a 36/56 on the Sauer Balance test   He will benefit from PT services in order to address the above deficits, improve balance, and to promote maximal functional independence    extremity/core strengthening, neuromuscular control exercises, balance/proprioception training as appropriate, gait training, and instruction in a comprehensive HEP  Impairments: abnormal gait, abnormal or restricted ROM, abnormal movement and impaired balance  Functional limitations: Difficulty sit to stand transfers, festinating gait, and negotiates stairs step-to  Understanding of Dx/Px/POC: good   Prognosis: fair    Goals  STG (4 weeks)  1  Patient will demonstrate BBS increase > or = 5 points in order to decrease risk of falls  2  Patient will ambulate with Rollator with good technique to decrease risk of falls  LTG (8 weeks)  1  Patient will transfer sit to stand without difficulty  2  Patient will demonstrate BBS increase > or = 8 points in order to decrease risk of falls  3  Patient will negotiate stairs reciprocally with HR and SPC  Plan  Patient would benefit from: skilled physical therapy  Planned therapy interventions: balance, neuromuscular re-education, patient education, strengthening, stretching, therapeutic activities, therapeutic exercise, home exercise program and gait training  Frequency: 2x week  Duration in weeks: 8  Plan of Care beginning date: 10/1/2019  Plan of Care expiration date: 11/26/2019  Treatment plan discussed with: patient and family        Subjective Evaluation    History of Present Illness  Mechanism of injury: Patient states he saw his neurologist about a month ago and was given a prescription for outpatient PT  He reports difficulty getting up/down from a chair  Denies falls or LOB, although he does feel off balance  Pain  No pain reported    Social Support  Steps to enter house: yes (No HR)  2  Stairs in house: yes (Right HR)   14  Lives in: multiple-level home      Diagnostic Tests    FCE comments: Walk in shower with grab bars and built in bench  Dresses independently  Grandchildren cook, clean and do laundry  Does not drive  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  Treatments  Previous treatment: physical therapy  Current treatment: medication  Patient Goals  Patient goals for therapy: improved balance  Patient goal: Improve control with walking  Objective     Static Posture     Head  Forward  Shoulders  Rounded  Scapulae  Left protracted and right protracted  Thoracic Spine  Hyperkyphosis  Lumbar Spine   Flattened  Hip   Hip (Left): Increased flexion  Hip (Right): Increased flexion  Knee   Knee (Left): Flexed  Knee (Right): Flexed  Neurological Testing     Sensation     Hip   Left Hip   Intact: light touch    Right Hip   Intact: light touch    Strength/Myotome Testing     Left Hip   Planes of Motion   Flexion: 4+  Extension: 4-  Abduction: 4    Right Hip   Planes of Motion   Flexion: 4+  Extension: 4-  Abduction: 4    Additional Strength Details  Bilateral Quads and Hamstrings 4+/5  Bilateral ankle dorsiflexion 4/5    Ambulation     Ambulation: Level Surfaces   Ambulation with assistive device: independent  Ambulation without assistive device: contact guard assist    Ambulation: Stairs   Ascend stairs: independent  Pattern: non-reciprocal  Railings: one rail  Descend stairs: independent  Pattern: non-reciprocal  Railings: one rail    Observational Gait   Decreased walking speed, stride length, left step length and right step length  Left foot contact pattern: foot flat  Right foot contact pattern: foot flat  Left arm swing: decreased  Right arm swing: decreased    Additional Observational Gait Details  Festinating gait pattern with bilateral knee flexion contractures  Precautions: HTN, Mitral valve disease, Carotid artery disease, CAD, AAA, A-fib, Parkinson's Disease  Manual                                                                                   Exercise Diary  10/1            Pt education parkinson's disease  Use of rollator walker            Nustep             Standing hip 3 ways elyse  Step-ups F/L elyse               Mini-squats             Heel raises             Toe raises             Foam FT EO             Foam FA EC             Rocker board WS AP ML             Rocker board balance AP ML             Stepping over cones Fwd             Stepping over cones Lat                                                                                                              Modalities

## 2019-10-03 ENCOUNTER — OFFICE VISIT (OUTPATIENT)
Dept: PHYSICAL THERAPY | Facility: CLINIC | Age: 79
End: 2019-10-03
Payer: MEDICARE

## 2019-10-03 DIAGNOSIS — R26.81 UNSTEADY GAIT: Primary | ICD-10-CM

## 2019-10-03 PROCEDURE — 97116 GAIT TRAINING THERAPY: CPT | Performed by: PHYSICAL THERAPIST

## 2019-10-03 PROCEDURE — 97110 THERAPEUTIC EXERCISES: CPT | Performed by: PHYSICAL THERAPIST

## 2019-10-03 PROCEDURE — 97112 NEUROMUSCULAR REEDUCATION: CPT | Performed by: PHYSICAL THERAPIST

## 2019-10-03 NOTE — PROGRESS NOTES
Daily Note     Today's date: 10/3/2019  Patient name: Kulwinder Ramirez  : 1940  MRN: [id number]  Referring provider: Verona Monzon MD  Dx:   Encounter Diagnosis     ICD-10-CM    1  Unsteady gait R26 81                   Subjective: No new reports      Objective: See treatment diary below      Assessment: Tolerated treatment well  Patient would benefit from continued PT    VC's for sit to stand transfers with rollator  Difficulty WS AP on rocker board    Plan: Continue per plan of care  Precautions: HTN, Mitral valve disease, Carotid artery disease, CAD, AAA, A-fib, Parkinson's Disease  Manual                                                                                   Exercise Diary  10/1  10/3          Pt education parkinson's disease  Use of rollator walker            Nustep seat 12   L5x10'          Standing hip 3 ways elyse  Step-ups F/L elyse  6"x20          Mini-squats   x20          Heel raises             Toe raises             Foam FT EO   x1'          Foam FA EC   x1'          Rocker board WS AP ML   x20 ea          Rocker board balance AP ML             Stepping over cones Fwd             Stepping over cones Lat               GT with Rollator   x100'                                                                                            Modalities

## 2019-10-09 ENCOUNTER — OFFICE VISIT (OUTPATIENT)
Dept: PHYSICAL THERAPY | Facility: CLINIC | Age: 79
End: 2019-10-09
Payer: MEDICARE

## 2019-10-09 DIAGNOSIS — R26.81 UNSTEADY GAIT: Primary | ICD-10-CM

## 2019-10-09 PROCEDURE — 97112 NEUROMUSCULAR REEDUCATION: CPT

## 2019-10-09 PROCEDURE — 97110 THERAPEUTIC EXERCISES: CPT

## 2019-10-09 NOTE — PROGRESS NOTES
Daily Note     Today's date: 10/9/2019  Patient name: Elvira Self  : 1940  MRN: [id number]  Referring provider: Arnulfo Mercado MD  Dx:   Encounter Diagnosis     ICD-10-CM    1  Unsteady gait R26 81            [id number]       Subjective: Patient states he doing well today  Objective: See treatment diary below      Assessment: Tolerated treatment fair  Patient required frequent rest breaks in between exercises to complete  He had a hard time comprehending proper technique for mini squats noting "feeling it" in knees due to poor technique, instructed in proper way  Introduced heel raises/toe raises with good tolerance  He did begin to demonstrate fatigue unable to stand up from chair half way through treatment needing longer rest break to finish therapy  Demonstrated good balance, did not need HHA , but contact supervision for eyes closed exercise due to forward lean  Patient demonstrated fatigue post treatment and would benefit from continued PT      Plan: Continue per plan of care  Precautions: HTN, Mitral valve disease, Carotid artery disease, CAD, AAA, A-fib, Parkinson's Disease  Manual                                                                                   Exercise Diary  10/1  10/3 10/8         Pt education parkinson's disease  Use of rollator walker            Nustep seat 12   L5x10' L5x10'         Standing hip 3 ways elyse  Step-ups F/L elyse  6"x20 6" x20         Mini-squats   x20 x20         Heel raises    x20         Toe raises    x20         Foam FT EO   x1' x1'         Foam FA EC   x1' x1'         Rocker board WS AP ML   x20 ea x20 ea  CS         Rocker board balance AP ML             Stepping over cones Fwd             Stepping over cones Lat               GT with Rollator   x100' np                                                                                           Modalities

## 2019-10-15 ENCOUNTER — OFFICE VISIT (OUTPATIENT)
Dept: PHYSICAL THERAPY | Facility: CLINIC | Age: 79
End: 2019-10-15
Payer: MEDICARE

## 2019-10-15 DIAGNOSIS — R26.81 UNSTEADY GAIT: Primary | ICD-10-CM

## 2019-10-15 PROCEDURE — 97110 THERAPEUTIC EXERCISES: CPT | Performed by: PHYSICAL THERAPIST

## 2019-10-15 PROCEDURE — 97530 THERAPEUTIC ACTIVITIES: CPT | Performed by: PHYSICAL THERAPIST

## 2019-10-15 PROCEDURE — 97112 NEUROMUSCULAR REEDUCATION: CPT | Performed by: PHYSICAL THERAPIST

## 2019-10-15 NOTE — PROGRESS NOTES
Daily Note     Today's date: 10/15/2019  Patient name: Luis Manuel Hull  : 1940  MRN: [id number]  Referring provider: Barber Brown MD  Dx:   Encounter Diagnosis     ICD-10-CM    1  Unsteady gait R26 81                   Subjective: Patient has noticed no improvement since starting physical therapy  Objective: See treatment diary below      Assessment: Tolerated treatment well  Patient would benefit from continued PT  Improved step length when stepping over cones at bar with single UE assist   Good balance with alternate toe taps on 6" step  Difficulty with balance on rockerboard, but no assistance needed  Plan: Continue per plan of care  Precautions: HTN, Mitral valve disease, Carotid artery disease, CAD, AAA, A-fib, Parkinson's Disease  Manual                                                                                   Exercise Diary  10/1  10/3 10/8 10/15        Pt education parkinson's disease  Use of rollator walker            Nustep seat 12   L5x10' L5x10' L5x10'        Standing hip 3 ways elyse  2# x20 ea        Step-ups F/L elyse  6"x20 6" x20 6" x20        Mini-squats   x20 x20 np        Heel raises    x20 np        Toe raises    x20 np        Foam FT EO   x1' x1' x1'        Foam FA EC   x1' x1' x1'        Rocker board WS AP ML   x20 ea x20 ea  CS         Rocker board balance AP ML     ML only x1' cl sup  Stepping over cones Fwd     To improve step length  3laps        Stepping over cones Lat               GT with Rollator   x100' np         Alternate toe tap on step     6" x20 no UE assist                                                                             Modalities

## 2019-10-17 ENCOUNTER — OFFICE VISIT (OUTPATIENT)
Dept: PHYSICAL THERAPY | Facility: CLINIC | Age: 79
End: 2019-10-17
Payer: MEDICARE

## 2019-10-17 DIAGNOSIS — R26.81 UNSTEADY GAIT: Primary | ICD-10-CM

## 2019-10-17 PROCEDURE — 97110 THERAPEUTIC EXERCISES: CPT | Performed by: PHYSICAL THERAPIST

## 2019-10-17 PROCEDURE — 97530 THERAPEUTIC ACTIVITIES: CPT | Performed by: PHYSICAL THERAPIST

## 2019-10-17 PROCEDURE — 97140 MANUAL THERAPY 1/> REGIONS: CPT | Performed by: PHYSICAL THERAPIST

## 2019-10-17 NOTE — PROGRESS NOTES
Daily Note     Today's date: 10/17/2019  Patient name: Neftali Diaz  : 1940  MRN: [id number]  Referring provider: Joy Gunderson MD  Dx:   Encounter Diagnosis     ICD-10-CM    1  Unsteady gait R26 81                   Subjective: Patient states he feels like his Parkinson's is getting worse  Objective: See treatment diary below      Assessment: Tolerated treatment well  Patient would benefit from continued PT  Unable to WS AP on rocker board due to [address]  No LOB with EC balance on foam today  Plan: Continue per plan of care  Precautions: HTN, Mitral valve disease, Carotid artery disease, CAD, AAA, A-fib, Parkinson's Disease  Manual                                                                                   Exercise Diary  10/1  10/3 10/8 10/15 10/17       Pt education parkinson's disease  Use of rollator walker            Nustep seat 12   L5x10' L5x10' L5x10'        Standing hip 3 ways elyse  2# x20 ea        Step-ups F/L elyse  6"x20 6" x20 6" x20        Mini-squats   x20 x20 np        Heel raises    x20 np        Toe raises    x20 np        Foam FT EO   x1' x1' x1' x1'       Foam FA EC   x1' x1' x1' x1'       Rocker board WS AP ML   x20 ea x20 ea  CS  AP only x20 ea  CS       Rocker board balance AP ML     ML only x1' cl sup  AP and ML x1' ea       Stepping over cones Fwd     To improve step length  3laps To improve step length  3laps       Stepping over cones Lat               GT with Rollator   x100' np         Alternate toe tap on step     6" x20 no UE assist 6" x20 no UE assist       Tandem walking at bar      X 2 laps                                                               Modalities

## 2019-10-22 ENCOUNTER — OFFICE VISIT (OUTPATIENT)
Dept: PHYSICAL THERAPY | Facility: CLINIC | Age: 79
End: 2019-10-22
Payer: MEDICARE

## 2019-10-22 DIAGNOSIS — R26.81 UNSTEADY GAIT: Primary | ICD-10-CM

## 2019-10-22 PROCEDURE — 97112 NEUROMUSCULAR REEDUCATION: CPT | Performed by: PHYSICAL THERAPIST

## 2019-10-22 PROCEDURE — 97110 THERAPEUTIC EXERCISES: CPT | Performed by: PHYSICAL THERAPIST

## 2019-10-22 PROCEDURE — 97116 GAIT TRAINING THERAPY: CPT | Performed by: PHYSICAL THERAPIST

## 2019-10-22 NOTE — PROGRESS NOTES
Daily Note     Today's date: 10/22/2019  Patient name: Amanda Gonzalez  : 1940  MRN: [id number]  Referring provider: Bertrand Chakraborty MD  Dx:   Encounter Diagnosis     ICD-10-CM    1  Unsteady gait R26 81                   Subjective: No new complaints per patient      Objective: See treatment diary below      Assessment: Tolerated treatment well  Patient would benefit from continued PT  Improved quality of gait with VC's to increase stride length bilaterally and trunk rotation  VC's for sequencing with SPC when taking longer stride  Plan: Continue per plan of care  Precautions: HTN, Mitral valve disease, Carotid artery disease, CAD, AAA, A-fib, Parkinson's Disease  Manual                                                                                   Exercise Diary  10/1  10/3 10/8 10/15 10/17 10/22      Pt education parkinson's disease  Use of rollator walker            Nustep seat 12   L5x10' L5x10' L5x10'        Standing hip 3 ways elyse  2# x20 ea  2# x20 ea      Step-ups F/L elyse  6"x20 6" x20 6" x20  6" x20  F only      Mini-squats   x20 x20 np        Heel raises    x20 np        Toe raises    x20 np        Foam FT EO   x1' x1' x1' x1' x1'      Foam FA EC   x1' x1' x1' x1' x1'      Rocker board WS AP ML   x20 ea x20 ea  CS  AP only x20 ea  CS np      Rocker board balance AP ML     ML only x1' cl sup  AP and ML x1' ea np      Stepping over cones Fwd     To improve step length  3laps To improve step length  3laps To improve step length  3laps      Stepping over cones Lat  GT with Rollator   x100' np         Alternate toe tap on step     6" x20 no UE assist 6" x20 no UE assist 6" x20 no UE assist      Tandem walking at bar      X 2 laps       Big step and big arm swing       3 laps      GT with SPC x150'       VC's to increase stride length                                      Modalities

## 2019-10-24 ENCOUNTER — OFFICE VISIT (OUTPATIENT)
Dept: PHYSICAL THERAPY | Facility: CLINIC | Age: 79
End: 2019-10-24
Payer: MEDICARE

## 2019-10-24 DIAGNOSIS — R26.81 UNSTEADY GAIT: Primary | ICD-10-CM

## 2019-10-24 PROCEDURE — 97116 GAIT TRAINING THERAPY: CPT | Performed by: PHYSICAL THERAPIST

## 2019-10-24 PROCEDURE — 97110 THERAPEUTIC EXERCISES: CPT | Performed by: PHYSICAL THERAPIST

## 2019-10-24 PROCEDURE — 97112 NEUROMUSCULAR REEDUCATION: CPT | Performed by: PHYSICAL THERAPIST

## 2019-10-24 NOTE — PROGRESS NOTES
Daily Note     Today's date: 10/24/2019  Patient name: Man Lee  : 1940  MRN: [id number]  Referring provider: Fredis Starr MD  Dx:   Encounter Diagnosis     ICD-10-CM    1  Unsteady gait R26 81                   Subjective: Patient feels he has been walking a little better  Objective: See treatment diary below      Assessment: Tolerated treatment well  Patient would benefit from continued PT   Improved stride length with walking when concentrating on it  VC's to improve upright posture  Plan: Continue per plan of care  Precautions: HTN, Mitral valve disease, Carotid artery disease, CAD, AAA, A-fib, Parkinson's Disease  Manual                                                                                   Exercise Diary  10/1  10/3 10/8 10/15 10/17 10/22 10/25     Pt education parkinson's disease  Use of rollator walker            Nustep seat 12   L5x10' L5x10' L5x10' L5x10' L5x10' L5x10'     Standing hip 3 ways elyse  2# x20 ea  2# x20 ea 2# x20 ea     Step-ups F/L elyse  6"x20 6" x20 6" x20  6" x20  F only 6" x20  F only     Mini-squats   x20 x20 np        Heel raises    x20 np        Toe raises    x20 np        Foam FT EO   x1' x1' x1' x1' x1' x1' head turns     Foam FA EC   x1' x1' x1' x1' x1' x1'     Rocker board WS AP ML   x20 ea x20 ea  CS  AP only x20 ea  CS np      Rocker board balance AP ML     ML only x1' cl sup  AP and ML x1' ea np      Stepping over cones Fwd     To improve step length  3laps To improve step length  3laps To improve step length  3laps np     Stepping over cones Lat  GT with Rollator   x100' np         Alternate toe tap on step     6" x20 no UE assist 6" x20 no UE assist 6" x20 no UE assist VC's to increase stride length     Tandem walking at bar      X 2 laps np      Big step and big arm swing       3 laps 3 laps     GT with SPC x150'       VC's to increase stride length   VC's to increase stride length Modalities

## 2019-10-29 ENCOUNTER — OFFICE VISIT (OUTPATIENT)
Dept: PHYSICAL THERAPY | Facility: CLINIC | Age: 79
End: 2019-10-29
Payer: MEDICARE

## 2019-10-29 DIAGNOSIS — R26.81 UNSTEADY GAIT: Primary | ICD-10-CM

## 2019-10-29 PROCEDURE — 97112 NEUROMUSCULAR REEDUCATION: CPT | Performed by: PHYSICAL THERAPIST

## 2019-10-29 PROCEDURE — 97116 GAIT TRAINING THERAPY: CPT | Performed by: PHYSICAL THERAPIST

## 2019-10-29 NOTE — PROGRESS NOTES
Daily Note     Today's date: 10/29/2019  Patient name: Natali Bueno  : 1940  MRN: [id number]  Referring provider: Jennifer Toussaint MD  Dx:   Encounter Diagnosis     ICD-10-CM    1  Unsteady gait R26 81                   Subjective: Patient states his bilateral knees were sore over the weekend on  when it was raining  Objective: See treatment diary below      Assessment: Tolerated treatment well  Patient has improved balance on foam with EC  Able to correct for postural sway to prevent LOB  Plan: Progress note during next visit  Precautions: HTN, Mitral valve disease, Carotid artery disease, CAD, AAA, A-fib, Parkinson's Disease  Manual                                                                                   Exercise Diary  10/1  10/3 10/8 10/15 10/17 10/22 10/25 10/29    Pt education parkinson's disease  Use of rollator walker            Nustep seat 12   L5x10' L5x10' L5x10' L5x10' L5x10' L5x10' L5x10'    Standing hip 3 ways elyse  2# x20 ea  2# x20 ea 2# x20 ea np    Step-ups F/L elyse  6"x20 6" x20 6" x20  6" x20  F only 6" x20  F only Up and over forward 6"x10 CG assist    Mini-squats   x20 x20 np    x20    Heel raises    x20 np        Toe raises    x20 np        Foam FT EO   x1' x1' x1' x1' x1' x1' head turns x1' head turns    Foam FA EC   x1' x1' x1' x1' x1' x1' x1'    Rocker board WS AP ML   x20 ea x20 ea  CS  AP only x20 ea  CS np      Rocker board balance AP ML     ML only x1' cl sup  AP and ML x1' ea np      Stepping over cones Fwd     To improve step length  3laps To improve step length  3laps To improve step length  3laps np 3 laps no UE assist   Doesn't make over cone, but increased stride length    Stepping over cones Lat               GT with Rollator   x100' np         Alternate toe tap on step     6" x20 no UE assist 6" x20 no UE assist 6" x20 no UE assist VC's to increase stride length     Tandem walking at bar      X 2 laps np      Big step and big arm swing       3 laps 3 laps 3 laps    GT with SPC x150'       VC's to increase stride length   VC's to increase stride length VC's to increase stride length                                  Modalities

## 2019-10-31 ENCOUNTER — TRANSCRIBE ORDERS (OUTPATIENT)
Dept: PHYSICAL THERAPY | Facility: CLINIC | Age: 79
End: 2019-10-31

## 2019-10-31 ENCOUNTER — EVALUATION (OUTPATIENT)
Dept: PHYSICAL THERAPY | Facility: CLINIC | Age: 79
End: 2019-10-31
Payer: MEDICARE

## 2019-10-31 DIAGNOSIS — R26.81 UNSTEADY GAIT: Primary | ICD-10-CM

## 2019-10-31 PROCEDURE — 97116 GAIT TRAINING THERAPY: CPT | Performed by: PHYSICAL THERAPIST

## 2019-10-31 PROCEDURE — 97112 NEUROMUSCULAR REEDUCATION: CPT | Performed by: PHYSICAL THERAPIST

## 2019-10-31 PROCEDURE — 97110 THERAPEUTIC EXERCISES: CPT | Performed by: PHYSICAL THERAPIST

## 2019-10-31 NOTE — LETTER
2019    Lenore Sharpe MD  [address] [address]    Patient: Mihai Vasquez   YOB: 1940   Date of Visit: 10/31/2019     Encounter Diagnosis     ICD-10-CM    1  Unsteady gait R26 81        Dear Dr Amrita Rodriguez: Thank you for your recent referral of Mihai Vasquez  Please review the attached evaluation summary from Denis's recent visit  Please verify that you agree with the plan of care by signing the attached order  If you have any questions or concerns, please do not hesitate to call  I sincerely appreciate the opportunity to share in the care of one of your patients and hope to have another opportunity to work with you in the near future  Sincerely,    Jorge Luis Muñoz PT      Referring Provider:      I certify that I have read the below Plan of Care and certify the need for these services furnished under this plan of treatment while under my care  Lenore Sharpe MD  [address] [facility] [address]: [phone]          PT Re-Evaluation     Today's date: 10/31/2019  Patient name: Mihai Vasquez  : 1940  MRN: [id number]  Referring provider: Arlene Vera MD  Dx:   Encounter Diagnosis     ICD-10-CM    1  Unsteady gait R26 81                   Assessment  Assessment details: Patient reports feeling 50% improved since starting PT services  He has noticed improved balance and quality of gait  He demonstrates improve stride length bilaterally and increased trunk rotation  Bilateral LE strength has improved to 4+/5 and his Sauer Balance score increased 10 points to a 46/56  Patient wishes to continue PT services x 1 week to work on balance deficits  He plans to DC PT services at that time due to travelling to [place] to see family    Impairments: abnormal gait, abnormal or restricted ROM, abnormal movement and impaired balance  Functional limitations: Difficulty sit to stand transfers, festinating gait, and negotiates stairs step-to  Understanding of Dx/Px/POC: good   Prognosis: fair    Goals  STG (4 weeks)  1  Patient will demonstrate BBS increase > or = 5 points in order to decrease risk of falls - met  2  Patient will ambulate with Rollator with good technique to decrease risk of falls - not met  LTG (8 weeks)  1  Patient will transfer sit to stand without difficulty - met  2  Patient will demonstrate BBS increase > or = 8 points in order to decrease risk of falls - met  3  Patient will negotiate stairs reciprocally with HR and SPC  - met    Plan  Patient would benefit from: skilled physical therapy  Planned therapy interventions: balance, neuromuscular re-education, patient education, strengthening, stretching, therapeutic activities, therapeutic exercise, home exercise program and gait training  Frequency: 2x week  Duration in weeks: 1  Plan of Care beginning date: 10/1/2019  Plan of Care expiration date: 11/7/2019  Treatment plan discussed with: patient and family        Subjective Evaluation    History of Present Illness  Mechanism of injury: Patient feels he is walking better since starting PT  He is now taking bigger strides with gait  He feels like his balance is still slightly off for a second when he transfers sit to stand  Denies LOB or falls with ADL's or ambulation  He feels 50% improved since starting physical therapy  Pain  No pain reported  Progression: improved    Social Support  Steps to enter house: yes (No HR)  2  Stairs in house: yes (Right HR)   14  Lives in: multiple-level home      Diagnostic Tests    FCE comments: Walk in shower with grab bars and built in bench  Dresses independently  Grandchildren cook, clean and do laundry  Does not drive  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  Treatments  Previous treatment: physical therapy  Current treatment: medication  Patient Goals  Patient goals for therapy: improved balance  Patient goal: Improve control with walking  Objective     Static Posture     Head  Forward  Shoulders  Rounded  Scapulae  Left protracted and right protracted  Thoracic Spine  Hyperkyphosis  Lumbar Spine   Flattened  Hip   Hip (Left): Increased flexion  Hip (Right): Increased flexion  Knee   Knee (Left): Flexed  Knee (Right): Flexed  Neurological Testing     Sensation     Hip   Left Hip   Intact: light touch    Right Hip   Intact: light touch    Strength/Myotome Testing     Left Hip   Planes of Motion   Flexion: 4+  Extension: 4+  Abduction: 4+    Right Hip   Planes of Motion   Flexion: 4+  Extension: 4+  Abduction: 4+    Additional Strength Details  Bilateral Quads and Hamstrings 4+/5  Bilateral ankle dorsiflexion 4+/5    Ambulation     Ambulation: Level Surfaces   Ambulation with assistive device: independent  Ambulation without assistive device: contact guard assist    Ambulation: Stairs   Ascend stairs: independent  Pattern: reciprocal  Railings: one rail  Descend stairs: independent  Pattern: reciprocal  Railings: one rail    Observational Gait   Left step length and right step length within functional limits  Decreased walking speed and stride length  Left foot contact pattern: foot flat  Right foot contact pattern: foot flat  Left arm swing: decreased  Right arm swing: decreased    Additional Observational Gait Details  bilateral knee flexion contractures               Precautions: HTN, Mitral valve disease, Carotid artery disease, CAD, AAA, A-fib, Parkinson's Disease         Manual                                                                                                                                                      Exercise Diary  10/31   10/3 10/8 10/15 10/17 10/22 10/25 10/29     Pt education                      Nustep seat 12 L5x10'   L5x10' L5x10' L5x10' L5x10' L5x10' L5x10' L5x10'     Standing hip 3 ways elyse          2# x20 ea   2# x20 ea 2# x20 ea np     Step-ups F/L elyse  Up and over forward 6"x10 CG assist   6"x20 6" x20 6" x20   6" x20  F only 6" x20  F only Up and over forward 6"x10 CG assist     Mini-squats     x20 x20 np       x20     Heel raises       x20 np             Toe raises       x20 np             Foam FT EO  x1' head turns   x1' x1' x1' x1' x1' x1' head turns x1' head turns     Foam FA EC  x1'   x1' x1' x1' x1' x1' x1' x1'     Rocker board WS AP ML     x20 ea x20 ea  CS   AP only x20 ea  CS np         Rocker board balance AP ML         ML only x1' cl sup  AP and ML x1' ea np         Stepping over cones Fwd 3 laps no UE assist   Doesn't make over cone, but increased stride length       To improve step length  3laps To improve step length  3laps To improve step length  3laps np 3 laps no UE assist   Doesn't make over cone, but increased stride length     Stepping over cones Lat                        GT with Rollator     x100' np               Alternate toe tap on step  VC's to increase stride length       6" x20 no UE assist 6" x20 no UE assist 6" x20 no UE assist VC's to increase stride length       Tandem walking at bar           X 2 laps np         Big step and big arm swing  np           3 laps 3 laps 3 laps     GT with SPC x150' VC's to increase stride length           VC's to increase stride length   VC's to increase stride length VC's to increase stride length                                                           Modalities

## 2019-10-31 NOTE — PROGRESS NOTES
PT Re-Evaluation     Today's date: 10/31/2019  Patient name: Charles Stephenson  : 1940  MRN: [id number]  Referring provider: Katty Kang MD  Dx:   Encounter Diagnosis     ICD-10-CM    1  Unsteady gait R26 81                   Assessment  Assessment details: Patient reports feeling 50% improved since starting PT services  He has noticed improved balance and quality of gait  He demonstrates improve stride length bilaterally and increased trunk rotation  Bilateral LE strength has improved to 4+/5 and his Sauer Balance score increased 10 points to a 46/56  Patient wishes to continue PT services x 1 week to work on balance deficits  He plans to DC PT services at that time due to travelling to [place] to see family  Impairments: abnormal gait, abnormal or restricted ROM, abnormal movement and impaired balance  Functional limitations: Difficulty sit to stand transfers, festinating gait, and negotiates stairs step-to  Understanding of Dx/Px/POC: good   Prognosis: fair    Goals  STG (4 weeks)  1  Patient will demonstrate BBS increase > or = 5 points in order to decrease risk of falls - met  2  Patient will ambulate with Rollator with good technique to decrease risk of falls - not met  LTG (8 weeks)  1  Patient will transfer sit to stand without difficulty - met  2  Patient will demonstrate BBS increase > or = 8 points in order to decrease risk of falls - met  3  Patient will negotiate stairs reciprocally with HR and SPC  - met    Plan  Patient would benefit from: skilled physical therapy  Planned therapy interventions: balance, neuromuscular re-education, patient education, strengthening, stretching, therapeutic activities, therapeutic exercise, home exercise program and gait training  Frequency: 2x week  Duration in weeks: 1  Plan of Care beginning date: 10/1/2019  Plan of Care expiration date: 2019  Treatment plan discussed with: patient and family        Subjective Evaluation    History of Present Illness  Mechanism of injury: Patient feels he is walking better since starting PT  He is now taking bigger strides with gait  He feels like his balance is still slightly off for a second when he transfers sit to stand  Denies LOB or falls with ADL's or ambulation  He feels 50% improved since starting physical therapy  Pain  No pain reported  Progression: improved    Social Support  Steps to enter house: yes (No HR)  2  Stairs in house: yes (Right HR)   14  Lives in: multiple-level home      Diagnostic Tests    FCE comments: Walk in shower with grab bars and built in bench  Dresses independently  Grandchildren cook, clean and do laundry  Does not drive  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  Treatments  Previous treatment: physical therapy  Current treatment: medication  Patient Goals  Patient goals for therapy: improved balance  Patient goal: Improve control with walking  Objective     Static Posture     Head  Forward  Shoulders  Rounded  Scapulae  Left protracted and right protracted  Thoracic Spine  Hyperkyphosis  Lumbar Spine   Flattened  Hip   Hip (Left): Increased flexion  Hip (Right): Increased flexion  Knee   Knee (Left): Flexed  Knee (Right): Flexed       Neurological Testing     Sensation     Hip   Left Hip   Intact: light touch    Right Hip   Intact: light touch    Strength/Myotome Testing     Left Hip   Planes of Motion   Flexion: 4+  Extension: 4+  Abduction: 4+    Right Hip   Planes of Motion   Flexion: 4+  Extension: 4+  Abduction: 4+    Additional Strength Details  Bilateral Quads and Hamstrings 4+/5  Bilateral ankle dorsiflexion 4+/5    Ambulation     Ambulation: Level Surfaces   Ambulation with assistive device: independent  Ambulation without assistive device: contact guard assist    Ambulation: Stairs   Ascend stairs: independent  Pattern: reciprocal  Railings: one rail  Descend stairs: independent  Pattern: reciprocal  Railings: one rail    Observational Gait   Left step length and right step length within functional limits  Decreased walking speed and stride length  Left foot contact pattern: foot flat  Right foot contact pattern: foot flat  Left arm swing: decreased  Right arm swing: decreased    Additional Observational Gait Details  bilateral knee flexion contractures  Precautions: HTN, Mitral valve disease, Carotid artery disease, CAD, AAA, A-fib, Parkinson's Disease         Manual                                                                                                                                                      Exercise Diary  10/31   10/3 10/8 10/15 10/17 10/22 10/25 10/29     Pt education                      Nustep seat 12 L5x10'   L5x10' L5x10' L5x10' L5x10' L5x10' L5x10' L5x10'     Standing hip 3 ways elyse          2# x20 ea   2# x20 ea 2# x20 ea np     Step-ups F/L elyse  Up and over forward 6"x10 CG assist   6"x20 6" x20 6" x20   6" x20  F only 6" x20  F only Up and over forward 6"x10 CG assist     Mini-squats     x20 x20 np       x20     Heel raises       x20 np             Toe raises       x20 np             Foam FT EO  x1' head turns   x1' x1' x1' x1' x1' x1' head turns x1' head turns     Foam FA EC  x1'   x1' x1' x1' x1' x1' x1' x1'     Rocker board WS AP ML     x20 ea x20 ea  CS   AP only x20 ea  CS np         Rocker board balance AP ML         ML only x1' cl sup   AP and ML x1' ea np         Stepping over cones Fwd 3 laps no UE assist   Doesn't make over cone, but increased stride length       To improve step length  3laps To improve step length  3laps To improve step length  3laps np 3 laps no UE assist   Doesn't make over cone, but increased stride length     Stepping over cones Lat                        GT with Rollator     x100' np               Alternate toe tap on step  VC's to increase stride length       6" x20 no UE assist 6" x20 no UE assist 6" x20 no UE assist VC's to increase stride length       Tandem walking at bar           X 2 laps np         Big step and big arm swing  np           3 laps 3 laps 3 laps     GT with SPC x150' VC's to increase stride length           VC's to increase stride length   VC's to increase stride length VC's to increase stride length                                                           Modalities

## 2019-11-05 ENCOUNTER — OFFICE VISIT (OUTPATIENT)
Dept: PHYSICAL THERAPY | Facility: CLINIC | Age: 79
End: 2019-11-05
Payer: MEDICARE

## 2019-11-05 DIAGNOSIS — R26.81 UNSTEADY GAIT: Primary | ICD-10-CM

## 2019-11-05 PROCEDURE — 97112 NEUROMUSCULAR REEDUCATION: CPT

## 2019-11-05 PROCEDURE — 97530 THERAPEUTIC ACTIVITIES: CPT

## 2019-11-05 PROCEDURE — 97110 THERAPEUTIC EXERCISES: CPT

## 2019-11-05 NOTE — PROGRESS NOTES
Daily Note     Today's date: 2019  Patient name: Amanda Gonzalez  : 1940  MRN: [id number]  Referring provider: Bertrand Chakraborty MD  Dx:   Encounter Diagnosis     ICD-10-CM    1  Unsteady gait R26 81        Start Time: 1140  Stop Time: 1220  Total time in clinic (min): 40 minutes    Subjective: Pt reported that this is his last week until after thanksgi  Pt reported that he has been feeling well with therapy and feels like he has improving  Objective: See treatment diary below      Assessment: Continued with treatment session, Pt progressed to lateral side walks with no increase in pain  Tolerated treatment well  Patient demonstrated fatigue post treatment, exhibited good technique with therapeutic exercises and would benefit from continued PT      Plan: Continue per plan of care  Precautions: HTN, Mitral valve disease, Carotid artery disease, CAD, AAA, A-fib, Parkinson's Disease         Manual                                                                                                                                                      Exercise Diary  10/31 11/5      10/29   Pt education            Nustep seat 12 L5x10' L5 10 min       L5x10'   Standing hip 3 ways elyse          np   Step-ups F/L elyse  Up and over forward 6"x10 CG assist Up and over F CS  6" 10x ea       Up and over forward 6"x10 CG assist   Mini-squats   20x       x20   Heel raises   np          Toe raises   np          Foam FT EO  x1' head turns X1' head turn CS      x1' head turns   Foam FA EC  x1' x1' CS      x1'   Rocker board WS AP ML   np          Rocker board balance AP ML   np          Stepping over cones Fwd 3 laps no UE assist   Doesn't make over cone, but increased stride length 3 laps no UE made over cones today         3 laps no UE assist   Doesn't make over cone, but increased stride length   Stepping over cones Lat    2 laps 1 with UE 1 without CG assist           GT with Rollator   Np          Alternate toe tap on step  VC's to increase stride length np          Tandem walking at bar   np          Big step and big arm swing  np np      3 laps   GT with SPC x150' VC's to increase stride length np      VC's to increase stride length                                     Modalities

## 2019-11-07 ENCOUNTER — OFFICE VISIT (OUTPATIENT)
Dept: PHYSICAL THERAPY | Facility: CLINIC | Age: 79
End: 2019-11-07
Payer: MEDICARE

## 2019-11-07 DIAGNOSIS — R26.81 UNSTEADY GAIT: Primary | ICD-10-CM

## 2019-11-07 PROCEDURE — 97150 GROUP THERAPEUTIC PROCEDURES: CPT

## 2019-11-07 PROCEDURE — 97112 NEUROMUSCULAR REEDUCATION: CPT

## 2019-11-07 NOTE — PROGRESS NOTES
Daily Note     Today's date: 2019  Patient name: Te Boles  : 1940  MRN: [id number]  Referring provider: Uri Schofield MD  Dx:   Encounter Diagnosis     ICD-10-CM    1  Unsteady gait R26 81        Start Time: 1215  Stop Time: 1300  Total time in clinic (min): 45 minutes    Subjective: Patient reported feeling good having no pain,       Objective: See treatment diary below      Assessment:   Continued with treatment session, no pain with exercises no LOBTolerated treatment well  Patient demonstrated fatigue post treatment, exhibited good technique with therapeutic exercises and would benefit from continued PT      Plan: Continue per plan of care  Precautions: HTN, Mitral valve disease, Carotid artery disease, CAD, AAA, A-fib, Parkinson's Disease         Manual                                                                                                                                                      Exercise Diary  10/31 11/5 11/7     10/29   Pt education   education on HEP          Nustep seat 12 L5x10' L5 10 min  L6 10  Min      L5x10'   Standing hip 3 ways elyse     np     np   Step-ups F/L elyse  Up and over forward 6"x10 CG assist Up and over F CS  6" 10x ea  Up and over CS 10xea      Up and over forward 6"x10 CG assist   Mini-squats   20x  20x      x20   Heel raises   np np         Toe raises   np np         Foam FT EO  x1' head turns X1' head turn CS X 1 min on foam      x1' head turns   Foam FA EC  x1' x1' CS      x1'   Rocker board WS AP ML   np np         Rocker board balance AP ML   np np         Stepping over cones Fwd 3 laps no UE assist   Doesn't make over cone, but increased stride length 3 laps no UE made over cones today    3 laps      3 laps no UE assist   Doesn't make over cone, but increased stride length   Stepping over cones Lat    2 laps 1 with UE 1 without CG assist  3 laps          GT with Rollator   Np          Alternate toe tap on step  VC's to increase stride length np          Tandem walking at bar   np          Big step and big arm swing  np np      3 laps   GT with SPC x150' VC's to increase stride length np      VC's to increase stride length                                     Modalities

## 2019-11-12 ENCOUNTER — APPOINTMENT (OUTPATIENT)
Dept: PHYSICAL THERAPY | Facility: CLINIC | Age: 79
End: 2019-11-12
Payer: MEDICARE

## 2019-11-14 ENCOUNTER — APPOINTMENT (OUTPATIENT)
Dept: PHYSICAL THERAPY | Facility: CLINIC | Age: 79
End: 2019-11-14
Payer: MEDICARE

## 2019-11-19 ENCOUNTER — APPOINTMENT (OUTPATIENT)
Dept: PHYSICAL THERAPY | Facility: CLINIC | Age: 79
End: 2019-11-19
Payer: MEDICARE

## 2019-11-21 ENCOUNTER — APPOINTMENT (OUTPATIENT)
Dept: PHYSICAL THERAPY | Facility: CLINIC | Age: 79
End: 2019-11-21
Payer: MEDICARE

## 2020-01-01 ENCOUNTER — OFFICE VISIT (OUTPATIENT)
Dept: NEUROLOGY | Facility: CLINIC | Age: 80
End: 2020-01-01
Payer: MEDICARE

## 2020-01-01 VITALS
HEIGHT: 74 IN | HEART RATE: 58 BPM | DIASTOLIC BLOOD PRESSURE: 60 MMHG | SYSTOLIC BLOOD PRESSURE: 124 MMHG | WEIGHT: 197 LBS | BODY MASS INDEX: 25.28 KG/M2

## 2020-01-01 DIAGNOSIS — G20 PARKINSON'S DISEASE (HCC): ICD-10-CM

## 2020-01-01 DIAGNOSIS — G20 PARKINSON'S DISEASE (HCC): Primary | ICD-10-CM

## 2020-01-01 DIAGNOSIS — I48.91 ATRIAL FIBRILLATION, CONTROLLED (HCC): ICD-10-CM

## 2020-01-01 PROCEDURE — 99214 OFFICE O/P EST MOD 30 MIN: CPT | Performed by: PSYCHIATRY & NEUROLOGY

## 2020-01-01 RX ORDER — CARBIDOPA/LEVODOPA 25MG-250MG
TABLET ORAL
Qty: 120 TABLET | Refills: 0 | Status: SHIPPED | OUTPATIENT
Start: 2020-01-01 | End: 2020-01-01

## 2020-01-01 RX ORDER — CARBIDOPA/LEVODOPA 25MG-250MG
TABLET ORAL
Qty: 120 TABLET | Refills: 0 | Status: SHIPPED | OUTPATIENT
Start: 2020-01-01 | End: 2021-01-01

## 2020-01-16 ENCOUNTER — CONSULT (OUTPATIENT)
Dept: NEUROLOGY | Facility: CLINIC | Age: 80
End: 2020-01-16
Payer: MEDICARE

## 2020-01-16 VITALS
WEIGHT: 210 LBS | BODY MASS INDEX: 26.95 KG/M2 | DIASTOLIC BLOOD PRESSURE: 80 MMHG | HEART RATE: 56 BPM | SYSTOLIC BLOOD PRESSURE: 140 MMHG | HEIGHT: 74 IN

## 2020-01-16 DIAGNOSIS — G20 PARKINSON'S DISEASE (HCC): Primary | ICD-10-CM

## 2020-01-16 DIAGNOSIS — I48.91 ATRIAL FIBRILLATION, CONTROLLED (HCC): ICD-10-CM

## 2020-01-16 PROCEDURE — 99204 OFFICE O/P NEW MOD 45 MIN: CPT | Performed by: PSYCHIATRY & NEUROLOGY

## 2020-01-16 RX ORDER — LATANOPROST 50 UG/ML
1 SOLUTION/ DROPS OPHTHALMIC 3 TIMES DAILY
COMMUNITY
Start: 2018-05-24

## 2020-01-16 RX ORDER — CARBIDOPA/LEVODOPA 25MG-250MG
1 TABLET ORAL 4 TIMES DAILY
Qty: 120 TABLET | Refills: 5 | Status: SHIPPED | OUTPATIENT
Start: 2020-01-16 | End: 2020-01-01

## 2020-01-16 NOTE — PROGRESS NOTES
Frank Lorenz is a 78 y o  male  Chief Complaint   Patient presents with    Parkinson's Disease       Assessment:  1  Parkinson's disease ([facility] )    2  Atrial fibrillation, controlled ([facility] )          Discussion:   Patient's records from her prior neurologist were reviewed, he does have Parkinson's disease, would recommend routine blood work and a CT scan of the brain, I discussed with the patient and his family different medication options including dopamine agonist, Azilect and Neupro patch, they do not want to go on those medications at this time, he is currently on Sinemet 25/251 tablet 3 times a day and seems to be tolerating it well, in the past he did not tolerate when Sinemet was increased to 5 times a day as he felt more sleepy and had hallucinations, he is agreeable to increase it to 4 times a day and see how he feels, side effects of the medication explained to the patient, if he has any worsening side effects then he will go down to 3 times a day, and call us I have advised him not to get up immediately after taking the medication and to wait at least for half an hour and to make sure that he is not feeling unsteady or dizzy as he would be at increased risk of bleeding secondary to being on anticoagulants, also advised him to go for physical therapy and do home exercise program, he was advised to use a standing walker and to take fall and safety precautions and aspiration precautions, also was advised he mentally stimulating exercises, we could consider having him see a movement disorder specialist in the future, he is in follow up with his family physician and cardiologist regarding his atrial fibrillation ,to keep his blood pressure cholesterol and sugar under control, to go to the hospital if has any worsening symptoms and call me otherwise to see me back in 2 months and follow up with his other physicians      Subjective:    HPI   Patient is here accompanied with his grandson and his girlfriend for evaluation of his history of Parkinson's disease, he had seen initially Dr Florencio Lynn [facility] neurologist and was diagnosed with Parkinson's disease about 6 years back and has been on Sinemet 25/250 mg 3 times a day, also recently he was seen by another neurologist Vicky Veras he and patient refused to go on Neupro or Azilect in addition to the Sinemet, also he was advised by him to see a movement disorder specialist, I explained to the patient that he can follow-up with Dr nerissa mcrae and we could have him see a movement disorder specialist but he would like to change his general neurologist and see us, he feels that his tremor is reasonably well controlled on Sinemet but at times he feels that his tremor is slightly worse, he denies any dyskinesias he feels his tremor is worse 4-5 hours after taking Sinemet, he does have some freezing episodes, denies any difficulty in swallowing, no hallucination, his sleep is good, mood is good, appetite is good, he has not had any falls, he uses a cane to ambulate, no focal weakness, he is able to do his ADLs , denies any issues with his memory he lives with his grandson and his girlfriend no bowel and bladder incontinence ,no other neurological complaints  Vitals:    01/16/20 1127   BP: 140/80   BP Location: Right arm   Patient Position: Sitting   Cuff Size: Adult   Pulse: 56   Weight: 95 3 kg (210 lb)   Height: 6' 2" (1 88 m)       Current Medications    Current Outpatient Medications:     carbidopa-levodopa (SINEMET)  mg per tablet, Take 1 tablet by mouth 3 (three) times a day, Disp: , Rfl:     diltiazem (TIAZAC) 300 MG 24 hr capsule, Take 300 mg by mouth daily  , Disp: , Rfl:     latanoprost (XALATAN) 0 005 % ophthalmic solution, Administer 1 drop to both eyes 3 (three) times a day, Disp: , Rfl:     metoprolol tartrate (LOPRESSOR) 50 mg tablet, Take 50 mg by mouth 2 (two) times a day , Disp: , Rfl:     Multiple Vitamin (DAILY VALUE MULTIVITAMIN) TABS, Take 1 tablet by mouth daily, Disp: , Rfl:     warfarin (JANTOVEN) 6 mg tablet, Take by mouth Tuesday-Wednesday 6 mg, Thursday- Monday 3 mg  Depending on PT/INR, Disp: , Rfl:       Allergies  Penicillin g; Penicillins; and Promethazine-dm    Past Medical History  Past Medical History:   Diagnosis Date    [facility]fib [facility])     AAA [facility]abdominal aortic aneurysm) [facility] )     CAD (coronary artery disease)     Carotid artery disease ([facility] )     Endoleak post (EVAR) endovascular aneurysm repair ([facility] )     Hyperlipidemia     Hypertension     Mitral valve disease     Parkinson's disease ([facility] )     Submandibular sialolithiasis          Past Surgical History:  Past Surgical History:   Procedure Laterality Date    A-V CARDIAC PACEMAKER INSERTION      ABDOMINAL AORTIC ANEURYSM REPAIR W/ ENDOLUMINAL GRAFT  08/2011    ABDOMINAL AORTIC ANEURYSM REPAIR W/ ENDOLUMINAL GRAFT  12/18/2014    AAA EVAR type 1B endoleak-- s/p EVAR w/ L IIA coil embolization    ABDOMINAL AORTIC ANEURYSM REPAIR, OPEN N/A 4/4/2016    Procedure: OPEN REPAIR ANEURYSM AORTIC ABDOMINAL (AAA), EXPLANTATION OF AORTIC STENT GRAFT ;  Surgeon: Dmitri Michaud MD;  [facility];  Service:     ABSCESS DRAINAGE Right     Neck    CORONARY ARTERY BYPASS GRAFT  2003    x1 (VG- RCA)    FOSS-MAZE MICROWAVE ABLATION  2003    MITRAL VALVE REPAIR  10/2003    MITRAL VALVE REPLACEMENT  11/2003    tissue bioproshetic    SALIVARY GLAND SURGERY      Transoral removal of submandibular stone (sialolithiasis/ sialadenitis)         Family History:  History reviewed  No pertinent family history  Social History:   reports that he has quit smoking  He has never used smokeless tobacco  He reports that he does not drink alcohol or use drugs  I have reviewed the past medical history, surgical history, social and family history, current medications, allergies vitals, review of systems, and updated this information as appropriate today     Objective:    Physical Exam    Neurological Exam      GENERAL:  Cooperative in no acute distress  Well-developed and well-nourished    HEAD and NECK   Head is atraumatic normocephalic with no lesions or masses  Neck is supple with full range of motion    CARDIOVASCULAR  Carotid Arteries-no carotid bruits  NEUROLOGIC:  Mental Status-the patient is awake alert and oriented without aphasia or apraxia  Cranial Nerves: Visual fields are full to confrontation  Discs are flat  Limited exam as unable to dilate the pupils Extraocular movements are full without nystagmus  Pupils are 2-1/2 mm and reactive  Face is symmetrical to light touch  Movements of facial expression move symmetrically  Hearing is normal to finger rub bilaterally  Soft palate lifts symmetrically  Shoulder shrug is symmetrical  Tongue is midline without atrophy  Motor: No drift is noted on arm extension  Strength is full in the upper and lower extremities with normal bulk and cogwheeling rigidity with resting tremor of the right hand worse than the left hand  Sensory: Intact to temperature and vibratory sensation in the upper and lower extremities bilaterally  Cortical function is intact  Coordination: Finger to nose testing is performed accurately  Ambulates with a cane and shuffling gait with decreased arm swing and parkinsonian features with stooped posture  Reflexes:      2+ and symmetrical Toes are downgoing  No cervical spine tenderness        ROS:  Review of Systems   Constitutional: Negative  Negative for appetite change, chills, fatigue and fever  HENT: Positive for voice change (hoarseness)  Negative for hearing loss, tinnitus and trouble swallowing  Eyes: Negative  Negative for photophobia, pain and visual disturbance  Respiratory: Negative  Negative for shortness of breath and wheezing  Cardiovascular: Negative  Negative for chest pain and palpitations  Gastrointestinal: Negative  Negative for nausea and vomiting  Endocrine: Negative    Negative for cold intolerance and heat intolerance  Genitourinary: Negative  Negative for dysuria, frequency and urgency  Musculoskeletal: Positive for back pain and gait problem  Negative for arthralgias, myalgias, neck pain and neck stiffness  Skin: Negative  Negative for rash  Allergic/Immunologic: Negative  Neurological: Positive for tremors and speech difficulty (delayed)  Negative for dizziness, seizures, syncope, facial asymmetry, weakness, light-headedness, numbness and headaches  Hematological: Negative  Does not bruise/bleed easily  Psychiatric/Behavioral: Negative  Negative for confusion, decreased concentration, hallucinations and sleep disturbance

## 2020-01-23 ENCOUNTER — TELEPHONE (OUTPATIENT)
Dept: NEUROLOGY | Facility: CLINIC | Age: 80
End: 2020-01-23

## 2020-01-23 NOTE — TELEPHONE ENCOUNTER
Lakshmi Sharma patient's Daughter called requesting copies of   Ángel Lafleur office notes for patient  She stated  patient had an appointment with Dr Michelet Rose on 01/20/2020 he was suppose to bring back his originals copies  Lakshmi Sharma also requested if we can give her nephew copies of Bautista's office notes I explained to her since her nephew is not in the [facility] we couldn't not provided give him with copies  Patient will pass by the office to sign a Release of Records  Copies will be printed and given to patient  Patient also stated Dr Michelet Rose  Prescribed patient carbidopa-levodopa (SINEMET)  mg per tablet [[id number]]  4 times a day  She stated since patient is taking this medication he had experienced Shaking on left arm   Lakshmi Sharma stated she lower patients dosage to 3 times a day instead

## 2020-04-08 ENCOUNTER — TELEPHONE (OUTPATIENT)
Dept: NEUROLOGY | Facility: CLINIC | Age: 80
End: 2020-04-08

## 2020-05-08 ENCOUNTER — TELEMEDICINE (OUTPATIENT)
Dept: NEUROLOGY | Facility: CLINIC | Age: 80
End: 2020-05-08
Payer: MEDICARE

## 2020-05-08 DIAGNOSIS — G20 PARKINSON'S DISEASE (HCC): Primary | ICD-10-CM

## 2020-05-08 PROCEDURE — 99213 OFFICE O/P EST LOW 20 MIN: CPT | Performed by: PSYCHIATRY & NEUROLOGY

## 2020-05-08 RX ORDER — CLINDAMYCIN HYDROCHLORIDE 150 MG/1
CAPSULE ORAL
COMMUNITY
Start: 2020-05-07

## 2020-09-16 NOTE — PROGRESS NOTES
Ilene Oleary is a 78 y o  male  Chief Complaint   Patient presents with    Parkinson's Disease       Assessment:  1  Parkinson's disease ([facility] )    2  Atrial fibrillation, controlled ([facility] )        Plan:  Continue with Sinemet 25/250 mg controlled release 1 tablet 3 times a day since patient has been on this medication for quite sometime and seems to be doing good, he is not keen to see a movement disorder specialist or go on any other medication  Follow-up with other physicians and see me back in 6 months    Discussion:  Patient is doing well on Sinemet 25/250 mg controlled release 1 tablet 3 times a day, he is not keen to see a movement disorder specialist or go on dopamine agonist, he was advised to take fall and safety precautions and aspiration precautions, his grandson is taking care of him since patient needs help with his ADLs he is going to contact office of aging if they can get him care or be the grand since since he is not working as he is taking care of his grandfather, he was advised to keep his blood pressure cholesterol and sugar under control, go to the hospital if has any worsening symptoms and call me otherwise to see me back in 6 months and follow up with his other physicians      Subjective:    HPI   Patient is here in follow-up for his tremor and Parkinson's disease, he has been diagnosed about 6 years back and is currently on Sinemet 25/250 mg controlled release 1 tablet 3 times a day and feels his tremor is under control, he still does have some resting tremor but overall he is able to manage, he ambulates with a cane, his grandson helps him with some ADLs, he denies any bowel and bladder incontinence, there is no swallowing difficulty, mood is good, sleep is good, no hallucination, no nausea or vomiting, no freezing episodes, no falls, denies any side effects to Sinemet, he has maxillary sinus status on his CT scan of the brain in February for which she was advised to follow up with family physician, memory is good, denies any other complaints  Vitals:    09/16/20 1556   BP: 124/60   BP Location: Left arm   Patient Position: Sitting   Cuff Size: Adult   Pulse: 58   Weight: 89 4 kg (197 lb)   Height: 6' 2" (1 88 m)       Current Medications    Current Outpatient Medications:     carbidopa-levodopa (SINEMET)  mg per tablet, Take 1 tablet by mouth 4 (four) times a day (Patient taking differently: Take 1 tablet by mouth 3 (three) times a day ), Disp: 120 tablet, Rfl: 5    diltiazem (TIAZAC) 300 MG 24 hr capsule, Take 300 mg by mouth daily  , Disp: , Rfl:     latanoprost (XALATAN) 0 005 % ophthalmic solution, Administer 1 drop to both eyes 3 (three) times a day, Disp: , Rfl:     metoprolol tartrate (LOPRESSOR) 50 mg tablet, Take 50 mg by mouth 2 (two) times a day , Disp: , Rfl:     warfarin (Jantoven) 5 mg tablet, Take by mouth Tuesday-Wednesday 6 mg, Thursday- Monday 3 mg   Depending on PT/INR, Disp: , Rfl:     clindamycin (CLEOCIN) 150 mg capsule, , Disp: , Rfl:     Multiple Vitamin (DAILY VALUE MULTIVITAMIN) TABS, Take 1 tablet by mouth daily, Disp: , Rfl:     Probiotic Product (PROBIOTIC-10 PO), Take 1 tablet by mouth daily, Disp: , Rfl:       Allergies  Penicillin g and Promethazine-dm    Past Medical History  Past Medical History:   Diagnosis Date    [facility])     AAA [facility]ab[facility]) [facility] )     CAD (coronary artery disease)     Carotid artery disease ([facility] )     Endoleak post (EVAR) endovascular aneurysm repair ([facility] )     Hyperlipidemia     Hypertension     Mitral valve disease     Parkinson's disease ([facility] )     Submandibular sialolithiasis          Past Surgical History:  Past Surgical History:   Procedure Laterality Date    A-V CARDIAC PACEMAKER INSERTION      ABDOMINAL AORTIC ANEURYSM REPAIR W/ ENDOLUMINAL GRAFT  08/2011    ABDOMINAL AORTIC ANEURYSM REPAIR W/ ENDOLUMINAL GRAFT  12/18/2014    AAA EVAR type 1B endoleak-- s/p EVAR w/ L IIA coil embolization  ABDOMINAL AORTIC ANEURYSM REPAIR, OPEN N/A 4/4/2016    Procedure: OPEN REPAIR ANEURYSM AORTIC ABDOMINAL (AAA), EXPLANTATION OF AORTIC STENT GRAFT ;  Surgeon: Sharon Contreras MD;  [facility];  Service:     ABSCESS DRAINAGE Right     Neck    CORONARY ARTERY BYPASS GRAFT  2003    x1 (VG- RCA)    FOSS-MAZE MICROWAVE ABLATION  2003    MITRAL VALVE REPAIR  10/2003    MITRAL VALVE REPLACEMENT  11/2003    tissue bioproshetic    SALIVARY GLAND SURGERY      Transoral removal of submandibular stone (sialolithiasis/ sialadenitis)         Family History:  History reviewed  No pertinent family history  Social History:   reports that he has quit smoking  He has never used smokeless tobacco  He reports that he does not drink alcohol or use drugs  I have reviewed the past medical history, surgical history, social and family history, current medications, allergies vitals, review of systems, and updated this information as appropriate today  Objective:    Physical Exam    Neurological Exam    GENERAL:  Cooperative in no acute distress  Well-developed and well-nourished    HEAD and NECK   Head is atraumatic normocephalic with no lesions or masses  Neck is supple with full range of motion    CARDIOVASCULAR  Carotid Arteries-no carotid bruits  NEUROLOGIC:  Mental Status-the patient is awake alert and oriented without aphasia or apraxia  Cranial Nerves: Visual fields are full to confrontation  Extraocular movements are full without nystagmus  Pupils are 2-1/2 mm and reactive  Face is symmetrical to light touch  Movements of facial expression move symmetrically  Hearing is normal to finger rub bilaterally  Soft palate lifts symmetrically  Shoulder shrug is symmetrical  Tongue is midline without atrophy  Patient did remove the mask for the exam  Motor: No drift is noted on arm extension   Strength is full in the upper and lower extremities with normal bulk and cogwheeling rigidity with some resting tremor of the right villanueva  Coordination: Finger to nose testing is performed accurately  Gait not evaluated patient on a wheelchair  ROS:  Review of Systems   Constitutional: Negative  Negative for appetite change and fever  HENT: Positive for drooling and voice change  Negative for hearing loss, tinnitus and trouble swallowing  Eyes: Negative  Negative for photophobia and pain  Respiratory: Negative  Negative for shortness of breath  Cardiovascular: Negative  Negative for palpitations  Gastrointestinal: Negative  Negative for nausea and vomiting  Endocrine: Negative  Negative for cold intolerance  Genitourinary: Negative  Negative for dysuria, frequency and urgency  Musculoskeletal: Positive for back pain and gait problem  Negative for myalgias and neck pain  Skin: Negative  Negative for rash  Neurological: Positive for dizziness, tremors (right hand) and weakness  Negative for seizures, syncope, facial asymmetry, speech difficulty, light-headedness, numbness and headaches  Hematological: Negative  Does not bruise/bleed easily  Psychiatric/Behavioral: Negative  Negative for confusion, hallucinations and sleep disturbance

## 2021-01-01 ENCOUNTER — TELEPHONE (OUTPATIENT)
Dept: NEUROLOGY | Facility: CLINIC | Age: 81
End: 2021-01-01

## 2021-01-01 ENCOUNTER — OFFICE VISIT (OUTPATIENT)
Dept: NEUROLOGY | Facility: CLINIC | Age: 81
End: 2021-01-01
Payer: MEDICARE

## 2021-01-01 VITALS
WEIGHT: 221 LBS | HEART RATE: 60 BPM | BODY MASS INDEX: 26.91 KG/M2 | DIASTOLIC BLOOD PRESSURE: 74 MMHG | SYSTOLIC BLOOD PRESSURE: 130 MMHG | HEIGHT: 76 IN

## 2021-01-01 DIAGNOSIS — G20 PARKINSON'S DISEASE (HCC): ICD-10-CM

## 2021-01-01 DIAGNOSIS — I48.91 ATRIAL FIBRILLATION, CONTROLLED (HCC): ICD-10-CM

## 2021-01-01 DIAGNOSIS — G20 PARKINSON'S DISEASE (HCC): Primary | ICD-10-CM

## 2021-01-01 PROCEDURE — 99214 OFFICE O/P EST MOD 30 MIN: CPT | Performed by: PSYCHIATRY & NEUROLOGY

## 2021-01-01 RX ORDER — CARBIDOPA/LEVODOPA 25MG-250MG
TABLET ORAL
Qty: 120 TABLET | Refills: 0 | Status: SHIPPED | OUTPATIENT
Start: 2021-01-01 | End: 2021-01-01

## 2021-01-01 RX ORDER — CARBIDOPA/LEVODOPA 25MG-250MG
1 TABLET ORAL 3 TIMES DAILY
Qty: 90 TABLET | Refills: 3 | Status: SHIPPED | OUTPATIENT
Start: 2021-01-01 | End: 2021-01-01

## 2021-03-17 NOTE — TELEPHONE ENCOUNTER
Schedule pt for his 6 month follow up with Dr Ortiz Benjamin on 9/21/2021 from his office visit with the doctor on 3/17/2021  Dr Ortiz Benjamin put in an order for CT Head  Pt did not want to schedule at this time    He stated that he will call at a later time to schedule

## 2021-03-17 NOTE — PROGRESS NOTES
Tanesha Araiza is a 78 y o  male  Chief Complaint   Patient presents with    Parkinson's disease       Assessment:  1  Parkinson's disease ([facility] [address] )    2  Atrial fibrillation, controlled ([facility] [address] )        Plan:   continue with Sinemet 25/250 mg controlled release 1 tablet 3 times a day   CT scan of the brain  follow-up in 6 months  fall and safety precautions  Discussion:   patient is doing well on Sinemet 25/250 mg controlled release 1 tablet 3 times a day, he is not keen to see a movement disorder specialist or go on dopamine agonist we did discuss about other medications, he seems to be doing well on this medication, he was supposed to have a CT scan of the brain in the past which she did not to and hence he is agreeable the family will call me after that to discuss the results, his grandson is taking care of him since patient needs help with his ADLs, he was advised to keep his blood pressure cholesterol and sugar under control , he is on Coumadin and is in follow up with his family physician and Cardiology, the family was advised the importance of patient not following since he is on blood thinners and should be under constant supervision,to take fall and safety and aspiration precautions, to go to the hospital if has any worsening symptoms and call me otherwise to see me back in 6 months and follow up with his other physicians      Subjective:    HPI    patient is here in follow-up for his tremor and Parkinson's disease, he has been diagnosed about 6-7 years back and is currently on Sinemet 25/250 mg controlled release 1 tablet 3 times a day and overall the feel his tremor is stable, he does have some resting tremor and some early morning stiffness, he has not had any falls, no difficulty in swallowing, he ambulates with a cane and a walker at home, his grandson helps him with his ADLs, he denies any bowel and bladder incontinence, there is no swallowing difficulty, sleep is good mood is good, no hallucination, no side effects, memory is good, no other complaints  Vitals:    03/17/21 1136   BP: 130/74   BP Location: Left arm   Patient Position: Sitting   Cuff Size: Adult   Pulse: 60   Weight: 100 kg (221 lb)   Height: 6' 4" (1 93 m)       Current Medications    Current Outpatient Medications:     carbidopa-levodopa (SINEMET)  mg per tablet, TAKE ONE TABLET BY MOUTH FOUR TIMES DAILY  (Patient taking differently: Take 1 tablet by mouth 3 (three) times a day ), Disp: 120 tablet, Rfl: 0    diltiazem (TIAZAC) 300 MG 24 hr capsule, Take 300 mg by mouth daily  , Disp: , Rfl:     latanoprost (XALATAN) 0 005 % ophthalmic solution, Administer 1 drop to both eyes 3 (three) times a day, Disp: , Rfl:     metoprolol tartrate (LOPRESSOR) 50 mg tablet, Take 50 mg by mouth 2 (two) times a day , Disp: , Rfl:     warfarin (Jantoven) 5 mg tablet, Take 5 mg by mouth Tuesday-Wednesday 6 mg, Thursday- Sunday 3 mg   Depending on PT/INR, Disp: , Rfl:     clindamycin (CLEOCIN) 150 mg capsule, , Disp: , Rfl:     Multiple Vitamin (DAILY VALUE MULTIVITAMIN) TABS, Take 1 tablet by mouth daily, Disp: , Rfl:     Probiotic Product (PROBIOTIC-10 PO), Take 1 tablet by mouth daily, Disp: , Rfl:       Allergies  Penicillin g and Promethazine-dm    Past Medical History  Past Medical History:   Diagnosis Date    A-fib [facility])     AAA (abdominal aortic aneurysm) ([address] )     CAD (coronary artery disease)     Carotid artery disease ([address] )     Endoleak post (EVAR) endovascular aneurysm repair ([address] )     Hyperlipidemia     Hypertension     Mitral valve disease     Parkinson's disease ([address] )     Submandibular sialolithiasis          Past Surgical History:  Past Surgical History:   Procedure Laterality Date    A-V CARDIAC PACEMAKER INSERTION      ABDOMINAL AORTIC ANEURYSM REPAIR W/ ENDOLUMINAL GRAFT  08/2011    ABDOMINAL AORTIC ANEURYSM REPAIR W/ ENDOLUMINAL GRAFT  12/18/2014    AAA EVAR type 1B endoleak-- s/p EVAR w/ L IIA coil embolization    ABDOMINAL AORTIC ANEURYSM REPAIR, OPEN N/A 4/4/2016    Procedure: OPEN REPAIR ANEURYSM AORTIC ABDOMINAL (AAA), EXPLANTATION OF AORTIC STENT GRAFT ;  Surgeon: Patricia London MD;  Location: [facility];  Service:     ABSCESS DRAINAGE Right     Neck    CORONARY ARTERY BYPASS GRAFT  2003    x1 (VG- RCA)    FOSS-MAZE MICROWAVE ABLATION  2003    MITRAL VALVE REPAIR  10/2003    MITRAL VALVE REPLACEMENT  11/2003    tissue bioproshetic    SALIVARY GLAND SURGERY      Transoral removal of submandibular stone (sialolithiasis/ sialadenitis)         Family History:  History reviewed  No pertinent family history  Social History:   reports that he has quit smoking  He has never used smokeless tobacco  He reports that he does not drink alcohol or use drugs  I have reviewed the past medical history, surgical history, social and family history, current medications, allergies vitals, review of systems, and updated this information as appropriate today  Objective:    Physical Exam    Neurological Exam    GENERAL:  Cooperative in no acute distress  Well-developed and well-nourished    HEAD and NECK   Head is atraumatic normocephalic with no lesions or masses  Neck is supple with full range of motion    CARDIOVASCULAR  Carotid Arteries-no carotid bruits  NEUROLOGIC:  Mental Status-the patient is awake alert and oriented without aphasia or apraxia  Cranial Nerves: Visual fields are full to confrontation  Extraocular movements are full without nystagmus  Pupils are 2-1/2 mm and reactive  Face is symmetrical to light touch  Movements of facial expression move symmetrically  Hearing is normal to finger rub bilaterally  Soft palate lifts symmetrically  Shoulder shrug is symmetrical  Tongue is midline without atrophy  Motor: No drift is noted on arm extension   Strength is full in the upper and lower extremities with normal bulk and  cogwheeling rigidity and some resting tremor of the right villanueva  Coordination: Finger to nose testing is performed accurately  Gait not evaluated patient on a wheelchair          ROS:  Review of Systems   Constitutional: Positive for fatigue  Negative for appetite change and fever  HENT: Positive for voice change  Negative for drooling, ear pain, tinnitus and trouble swallowing  Eyes: Negative for photophobia, pain and visual disturbance  Respiratory: Negative for chest tightness and shortness of breath  Cardiovascular: Negative for chest pain, palpitations and leg swelling  Gastrointestinal: Negative for abdominal pain, constipation, diarrhea, nausea and vomiting  Endocrine: Negative for cold intolerance and heat intolerance  Genitourinary: Negative for difficulty urinating, frequency and urgency  Musculoskeletal: Positive for back pain (Low back) and gait problem  Negative for joint swelling, myalgias and neck pain  Skin: Negative for rash  Neurological: Positive for tremors  Negative for dizziness, seizures, syncope, facial asymmetry, speech difficulty, weakness, light-headedness, numbness and headaches  Psychiatric/Behavioral: Negative for agitation, behavioral problems, confusion, decreased concentration, dysphoric mood and sleep disturbance  The patient is not nervous/anxious and is not hyperactive